# Patient Record
Sex: MALE | Race: WHITE | NOT HISPANIC OR LATINO | Employment: FULL TIME | URBAN - METROPOLITAN AREA
[De-identification: names, ages, dates, MRNs, and addresses within clinical notes are randomized per-mention and may not be internally consistent; named-entity substitution may affect disease eponyms.]

---

## 2020-04-23 ENCOUNTER — LAB VISIT (OUTPATIENT)
Dept: FAMILY MEDICINE | Facility: CLINIC | Age: 40
End: 2020-04-23
Payer: COMMERCIAL

## 2020-04-23 DIAGNOSIS — Z20.822 SUSPECTED COVID-19 VIRUS INFECTION: ICD-10-CM

## 2020-04-23 DIAGNOSIS — Z20.822 SUSPECTED COVID-19 VIRUS INFECTION: Primary | ICD-10-CM

## 2020-04-23 PROCEDURE — U0002 COVID-19 LAB TEST NON-CDC: HCPCS

## 2020-04-24 ENCOUNTER — TELEPHONE (OUTPATIENT)
Dept: FAMILY MEDICINE | Facility: CLINIC | Age: 40
End: 2020-04-24

## 2020-04-24 LAB — SARS-COV-2 RNA RESP QL NAA+PROBE: NOT DETECTED

## 2020-04-24 NOTE — TELEPHONE ENCOUNTER
----- Message from Rosalee Alberto PA-C sent at 4/24/2020  8:52 AM CDT -----  Your test was NEGATIVE for COVID-19 (coronavirus).  If you have symptoms, treat with rest, fluids, and over-the-counter medications.  Continue to stay home and practice proper handwashing.     If your symptoms worsen or if you have any other concerns, please contact [facility] at [phone].

## 2023-05-15 ENCOUNTER — OFFICE VISIT (OUTPATIENT)
Dept: URGENT CARE | Facility: CLINIC | Age: 43
End: 2023-05-15
Payer: COMMERCIAL

## 2023-05-15 VITALS
BODY MASS INDEX: 25.48 KG/M2 | HEART RATE: 80 BPM | WEIGHT: 182 LBS | RESPIRATION RATE: 18 BRPM | HEIGHT: 71 IN | OXYGEN SATURATION: 97 % | SYSTOLIC BLOOD PRESSURE: 114 MMHG | DIASTOLIC BLOOD PRESSURE: 77 MMHG | TEMPERATURE: 98 F

## 2023-05-15 DIAGNOSIS — S39.012A STRAIN OF LUMBAR PARASPINOUS MUSCLE, INITIAL ENCOUNTER: Primary | ICD-10-CM

## 2023-05-15 PROCEDURE — 96372 PR INJECTION,THERAP/PROPH/DIAG2ST, IM OR SUBCUT: ICD-10-PCS | Mod: S$GLB,,, | Performed by: INTERNAL MEDICINE

## 2023-05-15 PROCEDURE — 99204 OFFICE O/P NEW MOD 45 MIN: CPT | Mod: 25,S$GLB,, | Performed by: INTERNAL MEDICINE

## 2023-05-15 PROCEDURE — 99204 PR OFFICE/OUTPT VISIT, NEW, LEVL IV, 45-59 MIN: ICD-10-PCS | Mod: 25,S$GLB,, | Performed by: INTERNAL MEDICINE

## 2023-05-15 PROCEDURE — 96372 THER/PROPH/DIAG INJ SC/IM: CPT | Mod: S$GLB,,, | Performed by: INTERNAL MEDICINE

## 2023-05-15 RX ORDER — FINASTERIDE 1 MG/1
1 TABLET, FILM COATED ORAL DAILY
COMMUNITY

## 2023-05-15 RX ORDER — TIZANIDINE 2 MG/1
4 TABLET ORAL EVERY 8 HOURS PRN
Qty: 30 TABLET | Refills: 0 | Status: SHIPPED | OUTPATIENT
Start: 2023-05-15 | End: 2023-05-25

## 2023-05-15 RX ORDER — MELOXICAM 15 MG/1
15 TABLET ORAL DAILY
Qty: 14 TABLET | Refills: 0 | Status: SHIPPED | OUTPATIENT
Start: 2023-05-16 | End: 2023-05-30

## 2023-05-15 RX ORDER — KETOROLAC TROMETHAMINE 30 MG/ML
30 INJECTION, SOLUTION INTRAMUSCULAR; INTRAVENOUS
Status: COMPLETED | OUTPATIENT
Start: 2023-05-15 | End: 2023-05-15

## 2023-05-15 RX ORDER — DOLUTEGRAVIR SODIUM AND LAMIVUDINE 50; 300 MG/1; MG/1
1 TABLET, FILM COATED ORAL
COMMUNITY

## 2023-05-15 RX ADMIN — KETOROLAC TROMETHAMINE 30 MG: 30 INJECTION, SOLUTION INTRAMUSCULAR; INTRAVENOUS at 02:05

## 2023-05-15 NOTE — PATIENT INSTRUCTIONS
If you received toradol during your visit today, this is a powerful NSAID. Refrain from taking other NSAIDS such as ibuprofen, aleve, goodies powder for the next 24 hours.     Should you or your child develop any worsening or new symptoms after leaving urgent care, it is recommended that you go to the ER for further/repeat evaluation.     Follow up with your PCP or child's pediatrician in 3-5 days after your urgent care visit.     If you smoke, please stop smoking. It is very dangerous for your health.     If a referral was placed you may call [phone] to schedule this appointment.     Please remember that you have received care at an urgent care today. Urgent cares are not emergency rooms and are not equipped to handle life threatening emergencies and cannot rule in or out certain medical conditions and you may be released before all of your medical problems are known or treated, please schedule all follow up appointments as discussed and if you have worsening symptoms please go to the ER to rule out potential life threatening problems, as discussed.

## 2023-05-15 NOTE — PROGRESS NOTES
"Subjective:      Patient ID: José Miguel Hilton is a 42 y.o. male.    Vitals:  height is 5' 11" (1.803 m) and weight is 82.6 kg (182 lb). His temperature is 97.6 °F (36.4 °C). His blood pressure is 114/77 and his pulse is 80. His respiration is 18 and oxygen saturation is 97%.     Chief Complaint: Back Pain    Pain started during exercising on Friday. Lower left side pain in his back.    Back Pain  This is a new problem. The current episode started in the past 7 days. The problem occurs constantly. The problem has been gradually worsening since onset. The quality of the pain is described as aching. The pain radiates to the left thigh. The pain is at a severity of 6/10. The pain is moderate. The pain is The same all the time. The symptoms are aggravated by bending, twisting and coughing. Stiffness is present In the morning. Pertinent negatives include no dysuria.     Genitourinary:  Negative for dysuria.   Musculoskeletal:  Positive for back pain.   Skin:  Negative for erythema.    Objective:     Physical Exam   Constitutional: He is oriented to person, place, and time. He appears well-developed. No distress.   HENT:   Head: Normocephalic and atraumatic.   Ears:   Right Ear: External ear normal.   Left Ear: External ear normal.   Nose: Nose normal.   Mouth/Throat: Oropharynx is clear and moist. No oropharyngeal exudate.   Eyes: Conjunctivae and EOM are normal. Pupils are equal, round, and reactive to light. Right eye exhibits no discharge. Left eye exhibits no discharge. No scleral icterus.   Neck: Neck supple.   Cardiovascular: Normal rate, regular rhythm and normal heart sounds.   No murmur heard.Exam reveals no gallop and no friction rub.   Pulmonary/Chest: Effort normal. No respiratory distress. He has no wheezes. He has no rales.   Abdominal: Bowel sounds are normal. He exhibits no distension. Soft.   Musculoskeletal:      Thoracic back: Normal.      Lumbar back: He exhibits tenderness. He exhibits normal range of " motion, no swelling, no edema and no deformity.        Back:    Lymphadenopathy:     He has no cervical adenopathy.   Neurological: He is alert and oriented to person, place, and time. He has normal motor skills and normal sensation. Coordination normal. GCS eye subscore is 4. GCS verbal subscore is 5. GCS motor subscore is 6.   Reflex Scores:       Patellar reflexes are 2+ on the right side and 2+ on the left side.  Skin: Skin is warm, dry, not diaphoretic and no rash. Capillary refill takes less than 2 seconds. No erythema   Psychiatric: His behavior is normal.   Nursing note and vitals reviewed.  No saddle anesthesia.   Assessment:     1. Strain of lumbar paraspinous muscle, initial encounter        Plan:       Strain of lumbar paraspinous muscle, initial encounter  -     ketorolac injection 30 mg  -     meloxicam (MOBIC) 15 MG tablet; Take 1 tablet (15 mg total) by mouth once daily. for 14 days  Dispense: 14 tablet; Refill: 0  -     tiZANidine (ZANAFLEX) 2 MG tablet; Take 2 tablets (4 mg total) by mouth every 8 (eight) hours as needed (spasm/pain).  Dispense: 30 tablet; Refill: 0

## 2023-06-25 ENCOUNTER — OFFICE VISIT (OUTPATIENT)
Dept: URGENT CARE | Facility: CLINIC | Age: 43
End: 2023-06-25
Payer: COMMERCIAL

## 2023-06-25 VITALS
WEIGHT: 175 LBS | BODY MASS INDEX: 24.5 KG/M2 | OXYGEN SATURATION: 98 % | SYSTOLIC BLOOD PRESSURE: 115 MMHG | HEART RATE: 80 BPM | RESPIRATION RATE: 18 BRPM | HEIGHT: 71 IN | DIASTOLIC BLOOD PRESSURE: 72 MMHG | TEMPERATURE: 99 F

## 2023-06-25 DIAGNOSIS — S46.212A BICEPS MUSCLE TEAR, LEFT, INITIAL ENCOUNTER: ICD-10-CM

## 2023-06-25 DIAGNOSIS — M79.602 PAIN IN LEFT ARM: ICD-10-CM

## 2023-06-25 PROCEDURE — 73060 XR HUMERUS 2 VIEW LEFT: ICD-10-PCS | Mod: LT,S$GLB,, | Performed by: RADIOLOGY

## 2023-06-25 PROCEDURE — 73060 X-RAY EXAM OF HUMERUS: CPT | Mod: LT,S$GLB,, | Performed by: RADIOLOGY

## 2023-06-25 PROCEDURE — 99214 PR OFFICE/OUTPT VISIT, EST, LEVL IV, 30-39 MIN: ICD-10-PCS | Mod: S$GLB,,, | Performed by: FAMILY MEDICINE

## 2023-06-25 PROCEDURE — 99214 OFFICE O/P EST MOD 30 MIN: CPT | Mod: S$GLB,,, | Performed by: FAMILY MEDICINE

## 2023-06-25 RX ORDER — TADALAFIL 20 MG/1
TABLET ORAL
COMMUNITY
Start: 2023-05-26

## 2023-06-25 RX ORDER — ELVITEGRAVIR, COBICISTAT, EMTRICITABINE, AND TENOFOVIR ALAFENAMIDE 150; 150; 200; 10 MG/1; MG/1; MG/1; MG/1
1 TABLET ORAL
COMMUNITY
Start: 2023-04-27 | End: 2023-06-25 | Stop reason: ALTCHOICE

## 2023-06-25 RX ORDER — IBUPROFEN 600 MG/1
600 TABLET ORAL EVERY 8 HOURS PRN
Qty: 30 TABLET | Refills: 0 | Status: SHIPPED | OUTPATIENT
Start: 2023-06-25 | End: 2023-07-05

## 2023-06-25 RX ORDER — DOXYCYCLINE 100 MG/1
CAPSULE ORAL
COMMUNITY
Start: 2023-06-07

## 2023-06-25 NOTE — PROGRESS NOTES
"Subjective:      Patient ID: José Miguel Hilton is a 42 y.o. male.    Vitals:  height is 5' 11" (1.803 m) and weight is 79.4 kg (175 lb). His temperature is 98.8 °F (37.1 °C). His blood pressure is 115/72 and his pulse is 80. His respiration is 18 and oxygen saturation is 98%.     Chief Complaint: Arm Pain    Pt presents complaints of left arm pain. Pain started today. Pt states he was at the gym this morning and went to do a pull up and heard a crunching noise in his arm. Pain level 5. Constant. Pt states his arm does not hurt until he flexes his muscles. OTC none ice pack used with no relief.     Arm Pain   The incident occurred 1 to 3 hours ago. The incident occurred at the gym. The injury mechanism is unknown. The pain is present in the upper left arm. The quality of the pain is described as cramping and shooting. The pain does not radiate. The pain is at a severity of 5/10. The pain is mild. The pain has been Constant since the incident. Associated symptoms include numbness and tingling. Nothing aggravates the symptoms. He has tried nothing for the symptoms.   Neurological:  Positive for numbness.    Objective:     Physical Exam  Constitutional: Pt oriented to person, place, and time.  Non-toxic appearance.   Patient does not appear ill. No distress. normal  HENT: No icterus or facial swelling appreciated  Head: Normocephalic and atraumatic.   Nose: No congestion.   Pulmonary/Chest: Effort normal. No stridor. No respiratory distress.   Abdominal: Normal appearance. Abdomen exhibits no distension.   Musculoskeletal:         Left upper extremity:  Belly of Biceps appears to be running higher than on right side.  Provide deformity present.  There is mild tenderness to palpation at the distal upper arm in the lateral aspect of the elbow.  There is no gross swelling or ecchymosis.  Full range of motion flexing extending supinate and pronate and elbow.  Neurological: no focal deficit. Patient is alert and oriented to " person, place, and time.   Skin: Skin is not diaphoretic and not pale. no jaundice  Psychiatric: Patients behavior is normal. Mood, judgment and thought content normal.     Assessment:     1. Pain in left arm    2. Biceps muscle tear, left, initial encounter        Plan:       Pain in left arm  -     XR HUMERUS 2 VIEW LEFT; Future; Expected date: 06/25/2023-- soft tissue findings suspicious for biceps brachii rupture    -     Ambulatory referral/consult to Orthopedics  -     SLING FOR HOME USE  -     ibuprofen (ADVIL,MOTRIN) 600 MG tablet; Take 1 tablet (600 mg total) by mouth every 8 (eight) hours as needed for Pain.  Dispense: 30 tablet; Refill: 0    Biceps muscle tear, left, initial encounter  -     Ambulatory referral/consult to Orthopedics  -     SLING FOR HOME USE  -     ibuprofen (ADVIL,MOTRIN) 600 MG tablet; Take 1 tablet (600 mg total) by mouth every 8 (eight) hours as needed for Pain.  Dispense: 30 tablet; Refill: 0    The time of discharge patient had already made an appointment for orthopedic specialist tomorrow through the [facility] after the referral has been placed.    Advised to follow up closely with orthopedic specialist sling wear and [facility] therapy

## 2023-06-26 ENCOUNTER — OFFICE VISIT (OUTPATIENT)
Dept: SPORTS MEDICINE | Facility: CLINIC | Age: 43
End: 2023-06-26
Payer: COMMERCIAL

## 2023-06-26 VITALS
HEIGHT: 71 IN | DIASTOLIC BLOOD PRESSURE: 72 MMHG | SYSTOLIC BLOOD PRESSURE: 138 MMHG | BODY MASS INDEX: 26.15 KG/M2 | WEIGHT: 186.75 LBS

## 2023-06-26 DIAGNOSIS — M79.622 PAIN IN LEFT UPPER ARM: Primary | ICD-10-CM

## 2023-06-26 DIAGNOSIS — S46.212A RUPTURE OF DISTAL BICEPS TENDON, LEFT, INITIAL ENCOUNTER: ICD-10-CM

## 2023-06-26 PROCEDURE — 3075F PR MOST RECENT SYSTOLIC BLOOD PRESS GE 130-139MM HG: ICD-10-PCS | Mod: CPTII,S$GLB,, | Performed by: PHYSICIAN ASSISTANT

## 2023-06-26 PROCEDURE — 99999 PR PBB SHADOW E&M-EST. PATIENT-LVL III: ICD-10-PCS | Mod: PBBFAC,,, | Performed by: PHYSICIAN ASSISTANT

## 2023-06-26 PROCEDURE — 3075F SYST BP GE 130 - 139MM HG: CPT | Mod: CPTII,S$GLB,, | Performed by: PHYSICIAN ASSISTANT

## 2023-06-26 PROCEDURE — 1159F MED LIST DOCD IN RCRD: CPT | Mod: CPTII,S$GLB,, | Performed by: PHYSICIAN ASSISTANT

## 2023-06-26 PROCEDURE — 99203 OFFICE O/P NEW LOW 30 MIN: CPT | Mod: S$GLB,,, | Performed by: PHYSICIAN ASSISTANT

## 2023-06-26 PROCEDURE — 3008F BODY MASS INDEX DOCD: CPT | Mod: CPTII,S$GLB,, | Performed by: PHYSICIAN ASSISTANT

## 2023-06-26 PROCEDURE — 99999 PR PBB SHADOW E&M-EST. PATIENT-LVL III: CPT | Mod: PBBFAC,,, | Performed by: PHYSICIAN ASSISTANT

## 2023-06-26 PROCEDURE — 3008F PR BODY MASS INDEX (BMI) DOCUMENTED: ICD-10-PCS | Mod: CPTII,S$GLB,, | Performed by: PHYSICIAN ASSISTANT

## 2023-06-26 PROCEDURE — 3078F DIAST BP <80 MM HG: CPT | Mod: CPTII,S$GLB,, | Performed by: PHYSICIAN ASSISTANT

## 2023-06-26 PROCEDURE — 3078F PR MOST RECENT DIASTOLIC BLOOD PRESSURE < 80 MM HG: ICD-10-PCS | Mod: CPTII,S$GLB,, | Performed by: PHYSICIAN ASSISTANT

## 2023-06-26 PROCEDURE — 99203 PR OFFICE/OUTPT VISIT, NEW, LEVL III, 30-44 MIN: ICD-10-PCS | Mod: S$GLB,,, | Performed by: PHYSICIAN ASSISTANT

## 2023-06-26 PROCEDURE — 1159F PR MEDICATION LIST DOCUMENTED IN MEDICAL RECORD: ICD-10-PCS | Mod: CPTII,S$GLB,, | Performed by: PHYSICIAN ASSISTANT

## 2023-06-26 NOTE — PROGRESS NOTES
NEW PATIENT    HISTORY OF PRESENT ILLNESS   42 y.o. Male with a history of left elbow pain who injured his arm yesterday while working out.  When attempting to perform a pull up, he felt a tearing sensation within the crease of his elbow.  He had immediate discomfort and has noticed asymmetry of his biceps muscle.  He reports the pain has been minimal and he has good range of motion.        + mechanical symptoms, - instability    Is affecting ADLs.  Pain is 6/10 at it's worst.        PAST MEDICAL HISTORY    History reviewed. No pertinent past medical history.    PAST SURGICAL HISTORY     History reviewed. No pertinent surgical history.    FAMILY HISTORY    History reviewed. No pertinent family history.    SOCIAL HISTORY    Social History     Socioeconomic History    Marital status: Single   Tobacco Use    Smoking status: Never    Smokeless tobacco: Never       MEDICATIONS      Current Outpatient Medications:     dolutegravir-lamivudine (DOVATO)  mg Tab, Take 1 tablet by mouth., Disp: , Rfl:     doxycycline (VIBRAMYCIN) 100 MG Cap, Take by mouth., Disp: , Rfl:     finasteride (PROPECIA) 1 mg tablet, Take 1 mg by mouth once daily., Disp: , Rfl:     ibuprofen (ADVIL,MOTRIN) 600 MG tablet, Take 1 tablet (600 mg total) by mouth every 8 (eight) hours as needed for Pain., Disp: 30 tablet, Rfl: 0    tadalafiL (CIALIS) 20 MG Tab, Take by mouth., Disp: , Rfl:     ALLERGIES     Review of patient's allergies indicates:  No Known Allergies      REVIEW OF SYSTEMS   Constitution: Negative. Negative for chills, fever and night sweats.   HENT: Negative for congestion and headaches.    Eyes: Negative for blurred vision, left vision loss and right vision loss.   Cardiovascular: Negative for chest pain and syncope.   Respiratory: Negative for cough and shortness of breath.    Endocrine: Negative for polydipsia, polyphagia and polyuria.   Hematologic/Lymphatic: Negative for bleeding problem. Does not bruise/bleed easily.   Skin:  "Negative for dry skin, itching and rash.   Musculoskeletal: Negative for falls. Positive for left elbow pain.  Gastrointestinal: Negative for abdominal pain and bowel incontinence.   Genitourinary: Negative for bladder incontinence and nocturia.   Neurological: Negative for disturbances in coordination, loss of balance and seizures.   Psychiatric/Behavioral: Negative for depression. The patient does not have insomnia.    Allergic/Immunologic: Negative for hives and persistent infections.     PHYSICAL EXAMINATION    Vitals: /72 (BP Location: Right arm, Patient Position: Sitting, BP Method: Medium (Manual))   Ht 5' 11" (1.803 m)   Wt 84.7 kg (186 lb 11.7 oz)   BMI 26.04 kg/m²     General: The patient appears active and healthy with no apparent physical problems.  No disturbance of mood or affect is demonstrated. Alert and Oriented.    HEENT: Eyes normal, pupils equally round, nose normal.    Resp: Equal and symmetrical chest rises. No wheezing    CV: Regular rate    Neck: Supple; nonpainful range of motion.    Extremities: no cyanosis, clubbing, edema, or diffuse swelling.  Palpable pulses, good capillary refill of the digits.  No coolness, discoloration, edema or obvious varicosities.    Skin: no lesions noted.    Lymphatic: no detected adenopathy in the upper or lower extremities.    Neurologic: normal mental status, normal reflexes, normal gait and balance.  Patient is alert and oriented to person, place and time.  No flaccidity or spasticity is noted.  No motor or sensory deficits are noted.  Light touch is intact    Orthopaedic: Elbow Musculoskeletal Exam    Inspection    Left      Ecchymosis: none      Swelling: diffuse      Prior incision: none    Inspection additional comments: There is asymmetry of the distal biceps compared to the contralateral side    Palpation    Left      Left elbow palpation is normal.        Crepitus (radiocapitellar): none      Crepitus (ulnohumeral): none        Crepitus " (forearm rotation): none      Tenderness: present        Medial/flexor origin: none        Lateral/extensor origin: none        Radiocapitellar: moderate        Posterior-olecranon: none        Anterior: moderate        Ulnar nerve: none        MCL: none        LCL: none    Range of Motion    Left      Left elbow range of motion is normal.        Active Extension: 0      Passive Extension: 0      Active Flexion: 140      Passive Flexion: 140      Active Pronation: 90      Passive Pronation: 90      Active Supination: 90      Passive Supination: 90    Strength    Left      Extension: 5/5. Extension is not affected by pain.       Flexion: 4/5. Flexion is affected by pain.       Pronation: 5/5. Pronation is affected by pain.       Supination: 4+/5. Supination is affected by pain.       Finger abduction: 5/5. Finger abduction is not affected by pain.        strength: 5/5.  strength is not affected by pain.     Neurovascular    Left      Left elbow nerve sensation is normal.    Special Tests    Left      Left elbow special tests are normal.     Instability Exam      Varus test: negative        Valgus test: negative         Neurological Signs              Pain with resisted pronation: positive       Pain with resisted supination: positive     Special Signs       Pain with terminal extension: positive      Special tests additional comments: + hook sign      IMAGING    XR HUMERUS 2 VIEW LEFT  Narrative: EXAMINATION:  XR HUMERUS 2 VIEW LEFT    CLINICAL HISTORY:  Pain in left arm    TECHNIQUE:  Left humerus, two views    COMPARISON:  None    FINDINGS:  No fracture.  No lytic or blastic lesion.  Suspected proximal biceps brachii retraction in keeping with distal tendon tear.  Impression: As above.  Consider further evaluation with MRI elbow.    Electronically signed by: Manoj Gonsalez MD  Date:    06/25/2023  Time:    12:10        IMPRESSION       ICD-10-CM ICD-9-CM   1. Pain in left upper arm  M79.622 729.5   2.  Rupture of distal biceps tendon, left, initial encounter  S46.212A 840.8       MEDICATIONS PRESCRIBED      None    RECOMMENDATIONS     MRI left elbow for evaluation of suspected distal biceps rupture  Continue to use sling as needed for comfort  RTC with Sidney Morris MD for MRI results and surgical planning; patient has an upcoming trip to [facility] in August and would like to have surgery before his trip if possible      All questions were answered, pt will contact us for questions or concerns in the interim.

## 2023-06-27 ENCOUNTER — HOSPITAL ENCOUNTER (OUTPATIENT)
Dept: RADIOLOGY | Facility: OTHER | Age: 43
Discharge: HOME | End: 2023-06-27
Attending: PHYSICIAN ASSISTANT
Payer: COMMERCIAL

## 2023-06-27 DIAGNOSIS — M79.622 PAIN IN LEFT UPPER ARM: ICD-10-CM

## 2023-06-27 DIAGNOSIS — S46.212A RUPTURE OF DISTAL BICEPS TENDON, LEFT, INITIAL ENCOUNTER: ICD-10-CM

## 2023-06-27 PROCEDURE — 73221 MRI JOINT UPR EXTREM W/O DYE: CPT | Mod: 26,LT,, | Performed by: RADIOLOGY

## 2023-06-27 PROCEDURE — 73221 MRI JOINT UPR EXTREM W/O DYE: CPT | Mod: TC,LT

## 2023-06-27 PROCEDURE — 73221 MRI ELBOW WITHOUT CONTRAST LEFT: ICD-10-PCS | Mod: 26,LT,, | Performed by: RADIOLOGY

## 2023-06-29 ENCOUNTER — PATIENT MESSAGE (OUTPATIENT)
Dept: SPORTS MEDICINE | Facility: CLINIC | Age: 43
End: 2023-06-29
Payer: COMMERCIAL

## 2023-07-03 ENCOUNTER — OFFICE VISIT (OUTPATIENT)
Dept: SPORTS MEDICINE | Facility: CLINIC | Age: 43
End: 2023-07-03
Payer: COMMERCIAL

## 2023-07-03 ENCOUNTER — HOSPITAL ENCOUNTER (OUTPATIENT)
Dept: RADIOLOGY | Facility: HOSPITAL | Age: 43
Discharge: HOME | End: 2023-07-03
Attending: ORTHOPAEDIC SURGERY
Payer: COMMERCIAL

## 2023-07-03 VITALS
WEIGHT: 185 LBS | DIASTOLIC BLOOD PRESSURE: 83 MMHG | HEIGHT: 71 IN | HEART RATE: 95 BPM | SYSTOLIC BLOOD PRESSURE: 127 MMHG | BODY MASS INDEX: 25.9 KG/M2

## 2023-07-03 DIAGNOSIS — Z21: ICD-10-CM

## 2023-07-03 DIAGNOSIS — S46.212A RUPTURE OF DISTAL BICEPS TENDON, LEFT, INITIAL ENCOUNTER: Primary | ICD-10-CM

## 2023-07-03 DIAGNOSIS — S46.212A RUPTURE OF DISTAL BICEPS TENDON, LEFT, INITIAL ENCOUNTER: ICD-10-CM

## 2023-07-03 PROCEDURE — 1159F PR MEDICATION LIST DOCUMENTED IN MEDICAL RECORD: ICD-10-PCS | Mod: CPTII,S$GLB,, | Performed by: ORTHOPAEDIC SURGERY

## 2023-07-03 PROCEDURE — 3079F DIAST BP 80-89 MM HG: CPT | Mod: CPTII,S$GLB,, | Performed by: ORTHOPAEDIC SURGERY

## 2023-07-03 PROCEDURE — 3008F PR BODY MASS INDEX (BMI) DOCUMENTED: ICD-10-PCS | Mod: CPTII,S$GLB,, | Performed by: ORTHOPAEDIC SURGERY

## 2023-07-03 PROCEDURE — 99215 OFFICE O/P EST HI 40 MIN: CPT | Mod: S$GLB,,, | Performed by: ORTHOPAEDIC SURGERY

## 2023-07-03 PROCEDURE — 99215 PR OFFICE/OUTPT VISIT, EST, LEVL V, 40-54 MIN: ICD-10-PCS | Mod: S$GLB,,, | Performed by: ORTHOPAEDIC SURGERY

## 2023-07-03 PROCEDURE — 73080 X-RAY EXAM OF ELBOW: CPT | Mod: 26,LT,, | Performed by: RADIOLOGY

## 2023-07-03 PROCEDURE — 3074F PR MOST RECENT SYSTOLIC BLOOD PRESSURE < 130 MM HG: ICD-10-PCS | Mod: CPTII,S$GLB,, | Performed by: ORTHOPAEDIC SURGERY

## 2023-07-03 PROCEDURE — 73080 XR ELBOW COMPLETE 3 VIEW LEFT: ICD-10-PCS | Mod: 26,LT,, | Performed by: RADIOLOGY

## 2023-07-03 PROCEDURE — 1159F MED LIST DOCD IN RCRD: CPT | Mod: CPTII,S$GLB,, | Performed by: ORTHOPAEDIC SURGERY

## 2023-07-03 PROCEDURE — 3074F SYST BP LT 130 MM HG: CPT | Mod: CPTII,S$GLB,, | Performed by: ORTHOPAEDIC SURGERY

## 2023-07-03 PROCEDURE — 73080 X-RAY EXAM OF ELBOW: CPT | Mod: TC,LT

## 2023-07-03 PROCEDURE — 3008F BODY MASS INDEX DOCD: CPT | Mod: CPTII,S$GLB,, | Performed by: ORTHOPAEDIC SURGERY

## 2023-07-03 PROCEDURE — 99999 PR PBB SHADOW E&M-EST. PATIENT-LVL III: ICD-10-PCS | Mod: PBBFAC,,, | Performed by: ORTHOPAEDIC SURGERY

## 2023-07-03 PROCEDURE — 99999 PR PBB SHADOW E&M-EST. PATIENT-LVL III: CPT | Mod: PBBFAC,,, | Performed by: ORTHOPAEDIC SURGERY

## 2023-07-03 PROCEDURE — 3079F PR MOST RECENT DIASTOLIC BLOOD PRESSURE 80-89 MM HG: ICD-10-PCS | Mod: CPTII,S$GLB,, | Performed by: ORTHOPAEDIC SURGERY

## 2023-07-03 NOTE — PROGRESS NOTES
NEW PATIENT    HISTORY OF PRESENT ILLNESS   42 y.o. Male with a history of left elbow pain who is referred today by Tuan Mcguire PA-C. 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 He was seen by Tuan who ordered an MRI.      History 6/27/2023:   42 y.o. Male with a history of left elbow pain who injured his arm yesterday while working out.  When attempting to perform a pull up, he felt a tearing sensation within the crease of his elbow.  He had immediate discomfort and has noticed asymmetry of his biceps muscle.  He reports the pain has been minimal and he has good range of motion.     Is affecting ADLs.  Pain is 2/10 at it's worst.        PAST MEDICAL HISTORY    History reviewed. No pertinent past medical history.    PAST SURGICAL HISTORY     History reviewed. No pertinent surgical history.    FAMILY HISTORY    History reviewed. No pertinent family history.    SOCIAL HISTORY    Social History     Socioeconomic History    Marital status: Single   Tobacco Use    Smoking status: Never    Smokeless tobacco: Never       MEDICATIONS      Current Outpatient Medications:     dolutegravir-lamivudine (DOVATO)  mg Tab, Take 1 tablet by mouth., Disp: , Rfl:     doxycycline (VIBRAMYCIN) 100 MG Cap, Take by mouth., Disp: , Rfl:     finasteride (PROPECIA) 1 mg tablet, Take 1 mg by mouth once daily., Disp: , Rfl:     ibuprofen (ADVIL,MOTRIN) 600 MG tablet, Take 1 tablet (600 mg total) by mouth every 8 (eight) hours as needed for Pain., Disp: 30 tablet, Rfl: 0    tadalafiL (CIALIS) 20 MG Tab, Take by mouth., Disp: , Rfl:     ALLERGIES     Review of patient's allergies indicates:  No Known Allergies      REVIEW OF SYSTEMS   Constitution: Negative. Negative for chills, fever and night sweats.   HENT: Negative for congestion and headaches.    Eyes: Negative for blurred vision, left vision loss and right vision loss.   Cardiovascular: Negative for chest pain and syncope.   Respiratory: Negative for cough and shortness of breath.   "  Endocrine: Negative for polydipsia, polyphagia and polyuria.   Hematologic/Lymphatic: Negative for bleeding problem. Does not bruise/bleed easily.   Skin: Negative for dry skin, itching and rash.   Musculoskeletal: Negative for falls. Positive for left elbow pain  Gastrointestinal: Negative for abdominal pain and bowel incontinence.   Genitourinary: Negative for bladder incontinence and nocturia.   Neurological: Negative for disturbances in coordination, loss of balance and seizures.   Psychiatric/Behavioral: Negative for depression. The patient does not have insomnia.    Allergic/Immunologic: Negative for hives and persistent infections.     PHYSICAL EXAMINATION    Vitals: /83   Pulse 95   Ht 5' 11" (1.803 m)   Wt 83.9 kg (185 lb)   BMI 25.80 kg/m²     General: The patient appears active and healthy with no apparent physical problems.  No disturbance of mood or affect is demonstrated. Alert and Oriented.    HEENT: Eyes normal, pupils equally round, nose normal.    Resp: Equal and symmetrical chest rises. No wheezing    CV: Regular rate    Neck: Supple; nonpainful range of motion.    Extremities: no cyanosis, clubbing, edema, or diffuse swelling.  Palpable pulses, good capillary refill of the digits.  No coolness, discoloration, edema or obvious varicosities.    Skin: no lesions noted.    Lymphatic: no detected adenopathy in the upper or lower extremities.    Neurologic: normal mental status, normal reflexes, normal gait and balance.  Patient is alert and oriented to person, place and time.  No flaccidity or spasticity is noted.  No motor or sensory deficits are noted.  Light touch is intact    Orthopaedic:     Elbow Exam-LEFT    Inspection: Normal skin color and appearance, no ecchymosis, no swelling, no scars.  There is a normal carrying angle.      ROM: 0° - 135+° with 80° supination and 80° pronation. Pain with pronation and supination      Palpation: No tenderness at the medial epicondyle, " olecranon, radial head, or lateral epicondyle. Tender distal biceps tendon     The wrist flexor-pronator muscle group is nontender.    The wrist extensor mobile wad is nontender.    Strength: Flexor and extensor motor strength is 5/5.      Stability: Varus and valgus stress reveals no instability. No Guarding    Neuro Reflexes are 2/2 biceps, brachioradialis and triceps. Sensation intact    Test: - Milking maneuver - VEO - Thinker's - Tinel's Sign - Ulnar nerve subluxation + Hook Test - Pain with resisted wrist ext - Pain with long finger ext      IMAGING    X-Ray Elbow Complete Left  Narrative: EXAMINATION:  XR ELBOW COMPLETE 3 VIEW LEFT    CLINICAL HISTORY:  Strain of muscle, fascia and tendon of other parts of biceps, left arm, initial encounter    TECHNIQUE:  AP, lateral, and oblique views of the left elbow were performed.    COMPARISON:  None    FINDINGS:  No fracture or dislocation.  No bone destruction identified  Impression: See above    Electronically signed by: Paul Jessica MD  Date:    07/03/2023  Time:    11:40    I reviewed an MRI of his left elbow which demonstrates a distal biceps tendon rupture with approximately 7-8 cm of retraction.      IMPRESSION       ICD-10-CM ICD-9-CM   1. Rupture of distal biceps tendon, left, initial encounter  S46.212A 840.8   2. HIV carrier  [id number]       MEDICATIONS PRESCRIBED      None    RECOMMENDATIONS     Surgical versus non-surgical options discussed today; left open distal biceps tendon repair  RTC for pre-op with Tuan Mcguire PA-C  The patient elected to proceed with operative intervention after all risks, benefits, and alternatives were discussed with the patient.  The risks of surgery include bleeding, scar formation, injuries to nerves, arteries and veins, need for additional surgeries, blood clots, infections, inability to return to the same level of the performance and the risk of anesthesia.   We also discussed the risks of surgery related to HIV.  He is  currently undetectable with a normal CD4 count.  He will send his labs to us.      All questions were answered, pt will contact us for questions or concerns in the interim.

## 2023-07-05 ENCOUNTER — PATIENT MESSAGE (OUTPATIENT)
Dept: PREADMISSION TESTING | Facility: HOSPITAL | Age: 43
End: 2023-07-05
Payer: COMMERCIAL

## 2023-07-05 NOTE — ANESTHESIA PAT ROS NOTE
07/05/2023  José Miguel Hilton is a 42 y.o., male.      Pre-op Assessment    I have reviewed the Patient Summary Reports.       I have reviewed the Medications.     Review of Systems  Anesthesia Hx:  No previous Anesthesia   Neg history of prior surgery.             Social:  Non-Smoker, Social Alcohol Use       Hematology/Oncology:    Oncology Normal                Hematology Comments: HIV carrier, (undetectable), CD4 is slightly low- see labs scanned into media tab                    EENT/Dental:  EENT/Dental Normal           Cardiovascular:  Cardiovascular Normal Exercise tolerance: good       Denies CAD.       Denies Angina.       Denies IBRAHIM.                            Pulmonary:  Pulmonary Normal    Denies Asthma.   Denies Shortness of breath.                  Renal/:     Creat = 1.54 on 4/28/2023,  GFR = 58             Hepatic/GI:  Hepatic/GI Normal     Denies GERD. Denies Liver Disease.            Musculoskeletal:     Rupture of distal biceps tendon, left,  DDD cervical spine       Spine Disorders: cervical Degenerative disease           Neurological:  Neurology Normal      Denies Headaches. Denies Seizures.                                Endocrine:  Endocrine Normal Denies Diabetes. Denies Hypothyroidism.          Psych:  Psychiatric Normal                   No past medical history on file.  No past surgical history on file.      Anesthesia Assessment: Preoperative EQUATION    Planned Procedure: Procedure(s) (LRB):  REPAIR, TENDON, BICEPS, DISTAL (Left)  Requested Anesthesia Type:General  Surgeon: Sidney Morris MD  Service: Orthopedics  Known or anticipated Date of Surgery:7/11/2023    Surgeon notes: reviewed    Electronic QUestionnaire Assessment completed via nurse interview with patient.        Triage considerations:     The patient has no apparent active cardiac condition (No unstable coronary  Syndrome such as severe unstable angina or recent [<1 month] myocardial infarction, decompensated CHF, severe valvular   disease or significant arrhythmia)    Previous anesthesia records:None    Last PCP note: outside [facility]                  Instructions given. (See in Nurse's note)      Ht: 5'11  Wt: 185 lb  BMI: 25.80  Fully Vaccinated

## 2023-07-07 ENCOUNTER — OFFICE VISIT (OUTPATIENT)
Dept: SPORTS MEDICINE | Facility: CLINIC | Age: 43
End: 2023-07-07
Payer: COMMERCIAL

## 2023-07-07 VITALS
SYSTOLIC BLOOD PRESSURE: 118 MMHG | WEIGHT: 186.38 LBS | HEART RATE: 92 BPM | BODY MASS INDEX: 26.09 KG/M2 | DIASTOLIC BLOOD PRESSURE: 78 MMHG | HEIGHT: 71 IN

## 2023-07-07 DIAGNOSIS — S46.212A RUPTURE OF DISTAL BICEPS TENDON, LEFT, INITIAL ENCOUNTER: Primary | ICD-10-CM

## 2023-07-07 PROCEDURE — 99499 UNLISTED E&M SERVICE: CPT | Mod: S$GLB,,, | Performed by: PHYSICIAN ASSISTANT

## 2023-07-07 PROCEDURE — 99999 PR PBB SHADOW E&M-EST. PATIENT-LVL III: CPT | Mod: PBBFAC,,, | Performed by: PHYSICIAN ASSISTANT

## 2023-07-07 PROCEDURE — 99999 PR PBB SHADOW E&M-EST. PATIENT-LVL III: ICD-10-PCS | Mod: PBBFAC,,, | Performed by: PHYSICIAN ASSISTANT

## 2023-07-07 PROCEDURE — 99499 NO LOS: ICD-10-PCS | Mod: S$GLB,,, | Performed by: PHYSICIAN ASSISTANT

## 2023-07-07 RX ORDER — INDOMETHACIN 75 MG/1
75 CAPSULE, EXTENDED RELEASE ORAL 2 TIMES DAILY
Qty: 14 CAPSULE | Refills: 0 | Status: SHIPPED | OUTPATIENT
Start: 2023-07-12 | End: 2023-07-19

## 2023-07-07 RX ORDER — ESOMEPRAZOLE MAGNESIUM 40 MG/1
40 CAPSULE, DELAYED RELEASE ORAL
Qty: 7 CAPSULE | Refills: 0 | Status: SHIPPED | OUTPATIENT
Start: 2023-07-12 | End: 2023-07-19

## 2023-07-07 RX ORDER — CEFAZOLIN SODIUM 2 G/50ML
2 SOLUTION INTRAVENOUS
Status: CANCELLED | OUTPATIENT
Start: 2023-07-07

## 2023-07-07 RX ORDER — MUPIROCIN 20 MG/G
OINTMENT TOPICAL
Status: CANCELLED | OUTPATIENT
Start: 2023-07-07

## 2023-07-07 RX ORDER — HYDROCODONE BITARTRATE AND ACETAMINOPHEN 7.5; 325 MG/1; MG/1
1 TABLET ORAL EVERY 6 HOURS PRN
Qty: 20 TABLET | Refills: 0 | Status: SHIPPED | OUTPATIENT
Start: 2023-07-07

## 2023-07-07 NOTE — H&P (VIEW-ONLY)
H&P    History 7/7/2023:  José Miguel returns here today for follow-up evaluation of his left elbow and to complete his preoperative paperwork.  He has a distal biceps tendon rupture and surgical intervention has been recommended.  He is scheduled to undergo an open distal biceps tendon repair on July 11, 2023.    History 7/3/2023:  42 y.o. Male with a history of left elbow pain who is referred today by Tuan Mcguire PA-C. 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 He was seen by Tuan who ordered an MRI.      History 6/26/2023:   42 y.o. Male with a history of left elbow pain who injured his arm yesterday while working out.  When attempting to perform a pull up, he felt a tearing sensation within the crease of his elbow.  He had immediate discomfort and has noticed asymmetry of his biceps muscle.  He reports the pain has been minimal and he has good range of motion.     Is affecting ADLs.  Pain is 2/10 at it's worst.        PAST MEDICAL HISTORY    History reviewed. No pertinent past medical history.    PAST SURGICAL HISTORY     History reviewed. No pertinent surgical history.    FAMILY HISTORY    History reviewed. No pertinent family history.    SOCIAL HISTORY    Social History     Socioeconomic History    Marital status: Single   Tobacco Use    Smoking status: Never    Smokeless tobacco: Never       MEDICATIONS      Current Outpatient Medications:     dolutegravir-lamivudine (DOVATO)  mg Tab, Take 1 tablet by mouth., Disp: , Rfl:     doxycycline (VIBRAMYCIN) 100 MG Cap, Take by mouth., Disp: , Rfl:     finasteride (PROPECIA) 1 mg tablet, Take 1 mg by mouth once daily., Disp: , Rfl:     tadalafiL (CIALIS) 20 MG Tab, Take by mouth., Disp: , Rfl:     ALLERGIES     Review of patient's allergies indicates:  No Known Allergies      REVIEW OF SYSTEMS   Constitution: Negative. Negative for chills, fever and night sweats.   HENT: Negative for congestion and headaches.    Eyes: Negative for blurred vision, left vision loss and  "right vision loss.   Cardiovascular: Negative for chest pain and syncope.   Respiratory: Negative for cough and shortness of breath.    Endocrine: Negative for polydipsia, polyphagia and polyuria.   Hematologic/Lymphatic: Negative for bleeding problem. Does not bruise/bleed easily.   Skin: Negative for dry skin, itching and rash.   Musculoskeletal: Negative for falls. Positive for left elbow pain  Gastrointestinal: Negative for abdominal pain and bowel incontinence.   Genitourinary: Negative for bladder incontinence and nocturia.   Neurological: Negative for disturbances in coordination, loss of balance and seizures.   Psychiatric/Behavioral: Negative for depression. The patient does not have insomnia.    Allergic/Immunologic: Negative for hives and persistent infections.     PHYSICAL EXAMINATION    Vitals: /78   Pulse 92   Ht 5' 11" (1.803 m)   Wt 84.5 kg (186 lb 6.4 oz)   BMI 26.00 kg/m²     General: The patient appears active and healthy with no apparent physical problems.  No disturbance of mood or affect is demonstrated. Alert and Oriented.    HEENT: Eyes normal, pupils equally round, nose normal.    Resp: Equal and symmetrical chest rises. No wheezing    CV: Regular rate    Neck: Supple; nonpainful range of motion.    Extremities: no cyanosis, clubbing, edema, or diffuse swelling.  Palpable pulses, good capillary refill of the digits.  No coolness, discoloration, edema or obvious varicosities.    Skin: no lesions noted.    Lymphatic: no detected adenopathy in the upper or lower extremities.    Neurologic: normal mental status, normal reflexes, normal gait and balance.  Patient is alert and oriented to person, place and time.  No flaccidity or spasticity is noted.  No motor or sensory deficits are noted.  Light touch is intact    Orthopaedic:     Elbow Exam-LEFT    Inspection: Normal skin color and appearance, no ecchymosis, no swelling, no scars.  There is a normal carrying angle.      ROM: 0° - " 135+° with 80° supination and 80° pronation. Pain with pronation and supination      Palpation: No tenderness at the medial epicondyle, olecranon, radial head, or lateral epicondyle. Tender distal biceps tendon     The wrist flexor-pronator muscle group is nontender.    The wrist extensor mobile wad is nontender.    Strength: Flexor and extensor motor strength is 5/5.      Stability: Varus and valgus stress reveals no instability. No Guarding    Neuro Reflexes are 2/2 biceps, brachioradialis and triceps. Sensation intact    Test: - Milking maneuver - VEO - Thinker's - Tinel's Sign - Ulnar nerve subluxation + Hook Test - Pain with resisted wrist ext - Pain with long finger ext      IMAGING    X-Ray Elbow Complete Left  Narrative: EXAMINATION:  XR ELBOW COMPLETE 3 VIEW LEFT    CLINICAL HISTORY:  Strain of muscle, fascia and tendon of other parts of biceps, left arm, initial encounter    TECHNIQUE:  AP, lateral, and oblique views of the left elbow were performed.    COMPARISON:  None    FINDINGS:  No fracture or dislocation.  No bone destruction identified  Impression: See above    Electronically signed by: Paul Jessica MD  Date:    07/03/2023  Time:    11:40    I reviewed an MRI of his left elbow which demonstrates a distal biceps tendon rupture with approximately 7-8 cm of retraction.      IMPRESSION       ICD-10-CM ICD-9-CM   1. Rupture of distal biceps tendon, left, initial encounter  S46.212A 840.8         MEDICATIONS PRESCRIBED      Hydrocodone 7.5/325 mg  Indocin 75 mg  Nexium 40 mg    RECOMMENDATIONS     Surgical versus non-surgical options discussed today; left open distal biceps tendon repair  The patient elected to proceed with operative intervention after all risks, benefits, and alternatives were discussed with the patient.  The risks of surgery include bleeding, scar formation, injuries to nerves, arteries and veins, need for additional surgeries, blood clots, infections, inability to return to the same  level of the performance and the risk of anesthesia.   We also discussed the risks of surgery related to HIV.  He is currently undetectable with a normal CD4 count.  Informed consent was obtained  Physical therapy babak - [facility]    All questions were answered, pt will contact us for questions or concerns in the interim.

## 2023-07-07 NOTE — PROGRESS NOTES
PREOPERATIVE APPOINTMENT    History 7/7/2023:  José Miguel returns here today for follow-up evaluation of his left elbow and to complete his preoperative paperwork.  He has a distal biceps tendon rupture and surgical intervention has been recommended.  He is scheduled to undergo an open distal biceps tendon repair on July 11, 2023.    History 7/3/2023:  42 y.o. Male with a history of left elbow pain who is referred today by Tuan Mcguire PA-C. 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 He was seen by Tuan who ordered an MRI.      History 6/26/2023:   42 y.o. Male with a history of left elbow pain who injured his arm yesterday while working out.  When attempting to perform a pull up, he felt a tearing sensation within the crease of his elbow.  He had immediate discomfort and has noticed asymmetry of his biceps muscle.  He reports the pain has been minimal and he has good range of motion.     Is affecting ADLs.  Pain is 2/10 at it's worst.        PAST MEDICAL HISTORY    History reviewed. No pertinent past medical history.    PAST SURGICAL HISTORY     History reviewed. No pertinent surgical history.    FAMILY HISTORY    History reviewed. No pertinent family history.    SOCIAL HISTORY    Social History     Socioeconomic History    Marital status: Single   Tobacco Use    Smoking status: Never    Smokeless tobacco: Never       MEDICATIONS      Current Outpatient Medications:     dolutegravir-lamivudine (DOVATO)  mg Tab, Take 1 tablet by mouth., Disp: , Rfl:     doxycycline (VIBRAMYCIN) 100 MG Cap, Take by mouth., Disp: , Rfl:     finasteride (PROPECIA) 1 mg tablet, Take 1 mg by mouth once daily., Disp: , Rfl:     tadalafiL (CIALIS) 20 MG Tab, Take by mouth., Disp: , Rfl:     ALLERGIES     Review of patient's allergies indicates:  No Known Allergies      REVIEW OF SYSTEMS   Constitution: Negative. Negative for chills, fever and night sweats.   HENT: Negative for congestion and headaches.    Eyes: Negative for blurred vision,  "left vision loss and right vision loss.   Cardiovascular: Negative for chest pain and syncope.   Respiratory: Negative for cough and shortness of breath.    Endocrine: Negative for polydipsia, polyphagia and polyuria.   Hematologic/Lymphatic: Negative for bleeding problem. Does not bruise/bleed easily.   Skin: Negative for dry skin, itching and rash.   Musculoskeletal: Negative for falls. Positive for left elbow pain  Gastrointestinal: Negative for abdominal pain and bowel incontinence.   Genitourinary: Negative for bladder incontinence and nocturia.   Neurological: Negative for disturbances in coordination, loss of balance and seizures.   Psychiatric/Behavioral: Negative for depression. The patient does not have insomnia.    Allergic/Immunologic: Negative for hives and persistent infections.     PHYSICAL EXAMINATION    Vitals: /78   Pulse 92   Ht 5' 11" (1.803 m)   Wt 84.5 kg (186 lb 6.4 oz)   BMI 26.00 kg/m²     General: The patient appears active and healthy with no apparent physical problems.  No disturbance of mood or affect is demonstrated. Alert and Oriented.    HEENT: Eyes normal, pupils equally round, nose normal.    Resp: Equal and symmetrical chest rises. No wheezing    CV: Regular rate    Neck: Supple; nonpainful range of motion.    Extremities: no cyanosis, clubbing, edema, or diffuse swelling.  Palpable pulses, good capillary refill of the digits.  No coolness, discoloration, edema or obvious varicosities.    Skin: no lesions noted.    Lymphatic: no detected adenopathy in the upper or lower extremities.    Neurologic: normal mental status, normal reflexes, normal gait and balance.  Patient is alert and oriented to person, place and time.  No flaccidity or spasticity is noted.  No motor or sensory deficits are noted.  Light touch is intact    Orthopaedic:     Elbow Exam-LEFT    Inspection: Normal skin color and appearance, no ecchymosis, no swelling, no scars.  There is a normal carrying " angle.      ROM: 0° - 135+° with 80° supination and 80° pronation. Pain with pronation and supination      Palpation: No tenderness at the medial epicondyle, olecranon, radial head, or lateral epicondyle. Tender distal biceps tendon     The wrist flexor-pronator muscle group is nontender.    The wrist extensor mobile wad is nontender.    Strength: Flexor and extensor motor strength is 5/5.      Stability: Varus and valgus stress reveals no instability. No Guarding    Neuro Reflexes are 2/2 biceps, brachioradialis and triceps. Sensation intact    Test: - Milking maneuver - VEO - Thinker's - Tinel's Sign - Ulnar nerve subluxation + Hook Test - Pain with resisted wrist ext - Pain with long finger ext      IMAGING    X-Ray Elbow Complete Left  Narrative: EXAMINATION:  XR ELBOW COMPLETE 3 VIEW LEFT    CLINICAL HISTORY:  Strain of muscle, fascia and tendon of other parts of biceps, left arm, initial encounter    TECHNIQUE:  AP, lateral, and oblique views of the left elbow were performed.    COMPARISON:  None    FINDINGS:  No fracture or dislocation.  No bone destruction identified  Impression: See above    Electronically signed by: Paul Jessica MD  Date:    07/03/2023  Time:    11:40    I reviewed an MRI of his left elbow which demonstrates a distal biceps tendon rupture with approximately 7-8 cm of retraction.      IMPRESSION       ICD-10-CM ICD-9-CM   1. Rupture of distal biceps tendon, left, initial encounter  S46.212A 840.8         MEDICATIONS PRESCRIBED      Hydrocodone 7.5/325 mg  Indocin 75 mg  Nexium 40 mg    RECOMMENDATIONS     Surgical versus non-surgical options discussed today; left open distal biceps tendon repair  The patient elected to proceed with operative intervention after all risks, benefits, and alternatives were discussed with the patient.  The risks of surgery include bleeding, scar formation, injuries to nerves, arteries and veins, need for additional surgeries, blood clots, infections, inability  to return to the same level of the performance and the risk of anesthesia.   We also discussed the risks of surgery related to HIV.  He is currently undetectable with a normal CD4 count.  Informed consent was obtained  Physical therapy babak - [facility]    All questions were answered, pt will contact us for questions or concerns in the interim.

## 2023-07-10 ENCOUNTER — ANESTHESIA EVENT (OUTPATIENT)
Dept: SURGERY | Facility: HOSPITAL | Age: 43
End: 2023-07-10
Payer: COMMERCIAL

## 2023-07-10 ENCOUNTER — TELEPHONE (OUTPATIENT)
Dept: SPORTS MEDICINE | Facility: CLINIC | Age: 43
End: 2023-07-10
Payer: COMMERCIAL

## 2023-07-11 ENCOUNTER — HOSPITAL ENCOUNTER (OUTPATIENT)
Facility: HOSPITAL | Age: 43
Discharge: HOME | End: 2023-07-11
Attending: ORTHOPAEDIC SURGERY | Admitting: ORTHOPAEDIC SURGERY
Payer: COMMERCIAL

## 2023-07-11 ENCOUNTER — ANESTHESIA (OUTPATIENT)
Dept: SURGERY | Facility: HOSPITAL | Age: 43
End: 2023-07-11
Payer: COMMERCIAL

## 2023-07-11 VITALS
HEIGHT: 71 IN | WEIGHT: 186 LBS | HEART RATE: 82 BPM | RESPIRATION RATE: 24 BRPM | TEMPERATURE: 98 F | BODY MASS INDEX: 26.04 KG/M2 | DIASTOLIC BLOOD PRESSURE: 78 MMHG | SYSTOLIC BLOOD PRESSURE: 124 MMHG | OXYGEN SATURATION: 96 %

## 2023-07-11 DIAGNOSIS — S46.212A RUPTURE OF DISTAL BICEPS TENDON, LEFT, INITIAL ENCOUNTER: Primary | ICD-10-CM

## 2023-07-11 PROCEDURE — 63600175 PHARM REV CODE 636 W HCPCS: Performed by: ANESTHESIOLOGY

## 2023-07-11 PROCEDURE — 24342 PR REINSERT BI/TRICEPS TENDON,DISTAL: ICD-10-PCS | Mod: LT,,, | Performed by: ORTHOPAEDIC SURGERY

## 2023-07-11 PROCEDURE — 24342 REPAIR OF RUPTURED TENDON: CPT | Mod: LT,,, | Performed by: ORTHOPAEDIC SURGERY

## 2023-07-11 PROCEDURE — D9220A PRA ANESTHESIA: Mod: CRNA,,, | Performed by: NURSE ANESTHETIST, CERTIFIED REGISTERED

## 2023-07-11 PROCEDURE — D9220A PRA ANESTHESIA: ICD-10-PCS | Mod: CRNA,,, | Performed by: NURSE ANESTHETIST, CERTIFIED REGISTERED

## 2023-07-11 PROCEDURE — C1889 IMPLANT/INSERT DEVICE, NOC: HCPCS | Performed by: ORTHOPAEDIC SURGERY

## 2023-07-11 PROCEDURE — 71000033 HC RECOVERY, INTIAL HOUR: Performed by: ORTHOPAEDIC SURGERY

## 2023-07-11 PROCEDURE — 71000015 HC POSTOP RECOV 1ST HR: Performed by: ORTHOPAEDIC SURGERY

## 2023-07-11 PROCEDURE — D9220A PRA ANESTHESIA: ICD-10-PCS | Mod: ANES,,, | Performed by: ANESTHESIOLOGY

## 2023-07-11 PROCEDURE — 25000003 PHARM REV CODE 250: Performed by: NURSE ANESTHETIST, CERTIFIED REGISTERED

## 2023-07-11 PROCEDURE — 36000708 HC OR TIME LEV III 1ST 15 MIN: Performed by: ORTHOPAEDIC SURGERY

## 2023-07-11 PROCEDURE — D9220A PRA ANESTHESIA: Mod: ANES,,, | Performed by: ANESTHESIOLOGY

## 2023-07-11 PROCEDURE — 37000009 HC ANESTHESIA EA ADD 15 MINS: Performed by: ORTHOPAEDIC SURGERY

## 2023-07-11 PROCEDURE — 94761 N-INVAS EAR/PLS OXIMETRY MLT: CPT

## 2023-07-11 PROCEDURE — 36000709 HC OR TIME LEV III EA ADD 15 MIN: Performed by: ORTHOPAEDIC SURGERY

## 2023-07-11 PROCEDURE — C1713 ANCHOR/SCREW BN/BN,TIS/BN: HCPCS | Performed by: ORTHOPAEDIC SURGERY

## 2023-07-11 PROCEDURE — 63600175 PHARM REV CODE 636 W HCPCS: Performed by: NURSE ANESTHETIST, CERTIFIED REGISTERED

## 2023-07-11 PROCEDURE — 24342 REPAIR OF RUPTURED TENDON: CPT | Mod: AS,LT,, | Performed by: PHYSICIAN ASSISTANT

## 2023-07-11 PROCEDURE — 99900035 HC TECH TIME PER 15 MIN (STAT)

## 2023-07-11 PROCEDURE — 25000003 PHARM REV CODE 250: Performed by: ORTHOPAEDIC SURGERY

## 2023-07-11 PROCEDURE — 27201423 OPTIME MED/SURG SUP & DEVICES STERILE SUPPLY: Performed by: ORTHOPAEDIC SURGERY

## 2023-07-11 PROCEDURE — 63600175 PHARM REV CODE 636 W HCPCS: Performed by: PHYSICIAN ASSISTANT

## 2023-07-11 PROCEDURE — 25000003 PHARM REV CODE 250: Performed by: PHYSICIAN ASSISTANT

## 2023-07-11 PROCEDURE — 24342 PR REINSERT BI/TRICEPS TENDON,DISTAL: ICD-10-PCS | Mod: AS,LT,, | Performed by: PHYSICIAN ASSISTANT

## 2023-07-11 PROCEDURE — 37000008 HC ANESTHESIA 1ST 15 MINUTES: Performed by: ORTHOPAEDIC SURGERY

## 2023-07-11 PROCEDURE — 25000003 PHARM REV CODE 250: Performed by: STUDENT IN AN ORGANIZED HEALTH CARE EDUCATION/TRAINING PROGRAM

## 2023-07-11 PROCEDURE — 71000039 HC RECOVERY, EACH ADD'L HOUR: Performed by: ORTHOPAEDIC SURGERY

## 2023-07-11 PROCEDURE — 25000003 PHARM REV CODE 250: Performed by: ANESTHESIOLOGY

## 2023-07-11 DEVICE — SYS IMPL PROXIMAL TENODESIS: Type: IMPLANTABLE DEVICE | Site: ELBOW | Status: FUNCTIONAL

## 2023-07-11 RX ORDER — FENTANYL CITRATE 50 UG/ML
25 INJECTION, SOLUTION INTRAMUSCULAR; INTRAVENOUS EVERY 5 MIN PRN
Status: COMPLETED | OUTPATIENT
Start: 2023-07-11 | End: 2023-07-11

## 2023-07-11 RX ORDER — FENTANYL CITRATE 50 UG/ML
INJECTION, SOLUTION INTRAMUSCULAR; INTRAVENOUS
Status: DISCONTINUED | OUTPATIENT
Start: 2023-07-11 | End: 2023-07-11

## 2023-07-11 RX ORDER — KETOROLAC TROMETHAMINE 30 MG/ML
30 INJECTION, SOLUTION INTRAMUSCULAR; INTRAVENOUS ONCE
Status: COMPLETED | OUTPATIENT
Start: 2023-07-11 | End: 2023-07-11

## 2023-07-11 RX ORDER — OXYCODONE HYDROCHLORIDE 5 MG/1
5 TABLET ORAL
Status: DISCONTINUED | OUTPATIENT
Start: 2023-07-11 | End: 2023-07-11 | Stop reason: HOSPADM

## 2023-07-11 RX ORDER — PROPOFOL 10 MG/ML
VIAL (ML) INTRAVENOUS
Status: DISCONTINUED | OUTPATIENT
Start: 2023-07-11 | End: 2023-07-11

## 2023-07-11 RX ORDER — DEXAMETHASONE SODIUM PHOSPHATE 4 MG/ML
INJECTION, SOLUTION INTRA-ARTICULAR; INTRALESIONAL; INTRAMUSCULAR; INTRAVENOUS; SOFT TISSUE
Status: DISCONTINUED | OUTPATIENT
Start: 2023-07-11 | End: 2023-07-11

## 2023-07-11 RX ORDER — MUPIROCIN 20 MG/G
OINTMENT TOPICAL
Status: DISCONTINUED | OUTPATIENT
Start: 2023-07-11 | End: 2023-07-11 | Stop reason: HOSPADM

## 2023-07-11 RX ORDER — NEOSTIGMINE METHYLSULFATE 1 MG/ML
INJECTION, SOLUTION INTRAVENOUS
Status: DISCONTINUED | OUTPATIENT
Start: 2023-07-11 | End: 2023-07-11

## 2023-07-11 RX ORDER — ACETAMINOPHEN 500 MG
1000 TABLET ORAL
Status: COMPLETED | OUTPATIENT
Start: 2023-07-11 | End: 2023-07-11

## 2023-07-11 RX ORDER — ONDANSETRON 2 MG/ML
INJECTION INTRAMUSCULAR; INTRAVENOUS
Status: DISCONTINUED | OUTPATIENT
Start: 2023-07-11 | End: 2023-07-11

## 2023-07-11 RX ORDER — LIDOCAINE HYDROCHLORIDE 20 MG/ML
INJECTION INTRAVENOUS
Status: DISCONTINUED | OUTPATIENT
Start: 2023-07-11 | End: 2023-07-11

## 2023-07-11 RX ORDER — HYDROMORPHONE HYDROCHLORIDE 1 MG/ML
0.2 INJECTION, SOLUTION INTRAMUSCULAR; INTRAVENOUS; SUBCUTANEOUS EVERY 5 MIN PRN
Status: DISCONTINUED | OUTPATIENT
Start: 2023-07-11 | End: 2023-07-11 | Stop reason: HOSPADM

## 2023-07-11 RX ORDER — MIDAZOLAM HYDROCHLORIDE 1 MG/ML
INJECTION INTRAMUSCULAR; INTRAVENOUS
Status: DISCONTINUED | OUTPATIENT
Start: 2023-07-11 | End: 2023-07-11

## 2023-07-11 RX ORDER — CARBOXYMETHYLCELLULOSE SODIUM 10 MG/ML
GEL OPHTHALMIC
Status: DISCONTINUED | OUTPATIENT
Start: 2023-07-11 | End: 2023-07-11

## 2023-07-11 RX ORDER — FAMOTIDINE 10 MG/ML
INJECTION INTRAVENOUS
Status: DISCONTINUED | OUTPATIENT
Start: 2023-07-11 | End: 2023-07-11

## 2023-07-11 RX ORDER — ROCURONIUM BROMIDE 10 MG/ML
INJECTION, SOLUTION INTRAVENOUS
Status: DISCONTINUED | OUTPATIENT
Start: 2023-07-11 | End: 2023-07-11

## 2023-07-11 RX ORDER — BACITRACIN ZINC 500 UNIT/G
OINTMENT (GRAM) TOPICAL
Status: DISCONTINUED | OUTPATIENT
Start: 2023-07-11 | End: 2023-07-11 | Stop reason: HOSPADM

## 2023-07-11 RX ORDER — HALOPERIDOL 5 MG/ML
0.5 INJECTION INTRAMUSCULAR EVERY 10 MIN PRN
Status: DISCONTINUED | OUTPATIENT
Start: 2023-07-11 | End: 2023-07-11 | Stop reason: HOSPADM

## 2023-07-11 RX ORDER — KETAMINE HCL IN 0.9 % NACL 50 MG/5 ML
SYRINGE (ML) INTRAVENOUS
Status: DISCONTINUED | OUTPATIENT
Start: 2023-07-11 | End: 2023-07-11

## 2023-07-11 RX ADMIN — OXYCODONE HYDROCHLORIDE 5 MG: 5 TABLET ORAL at 12:07

## 2023-07-11 RX ADMIN — MUPIROCIN: 20 OINTMENT TOPICAL at 09:07

## 2023-07-11 RX ADMIN — FENTANYL CITRATE 25 MCG: 50 INJECTION INTRAMUSCULAR; INTRAVENOUS at 12:07

## 2023-07-11 RX ADMIN — HYDROMORPHONE HYDROCHLORIDE 0.2 MG: 1 INJECTION, SOLUTION INTRAMUSCULAR; INTRAVENOUS; SUBCUTANEOUS at 12:07

## 2023-07-11 RX ADMIN — FENTANYL CITRATE 100 MCG: 50 INJECTION, SOLUTION INTRAMUSCULAR; INTRAVENOUS at 10:07

## 2023-07-11 RX ADMIN — GLYCOPYRROLATE 0.4 MG: 0.2 INJECTION, SOLUTION INTRAMUSCULAR; INTRAVENOUS at 11:07

## 2023-07-11 RX ADMIN — HYDROMORPHONE HYDROCHLORIDE 0.2 MG: 1 INJECTION, SOLUTION INTRAMUSCULAR; INTRAVENOUS; SUBCUTANEOUS at 01:07

## 2023-07-11 RX ADMIN — Medication 20 MG: at 10:07

## 2023-07-11 RX ADMIN — ACETAMINOPHEN 1000 MG: 500 TABLET ORAL at 09:07

## 2023-07-11 RX ADMIN — FENTANYL CITRATE 50 MCG: 50 INJECTION, SOLUTION INTRAMUSCULAR; INTRAVENOUS at 10:07

## 2023-07-11 RX ADMIN — DEXAMETHASONE SODIUM PHOSPHATE 8 MG: 4 INJECTION, SOLUTION INTRAMUSCULAR; INTRAVENOUS at 10:07

## 2023-07-11 RX ADMIN — MIDAZOLAM HYDROCHLORIDE 2 MG: 1 INJECTION, SOLUTION INTRAMUSCULAR; INTRAVENOUS at 09:07

## 2023-07-11 RX ADMIN — PROPOFOL 100 MG: 10 INJECTION, EMULSION INTRAVENOUS at 10:07

## 2023-07-11 RX ADMIN — ROCURONIUM BROMIDE 50 MG: 10 INJECTION, SOLUTION INTRAVENOUS at 10:07

## 2023-07-11 RX ADMIN — CARBOXYMETHYLCELLULOSE SODIUM 4 DROP: 10 GEL OPHTHALMIC at 10:07

## 2023-07-11 RX ADMIN — KETOROLAC TROMETHAMINE 30 MG: 30 INJECTION, SOLUTION INTRAMUSCULAR; INTRAVENOUS at 12:07

## 2023-07-11 RX ADMIN — ONDANSETRON 4 MG: 2 INJECTION, SOLUTION INTRAMUSCULAR; INTRAVENOUS at 11:07

## 2023-07-11 RX ADMIN — NEOSTIGMINE METHYLSULFATE 4 MG: 1 INJECTION INTRAVENOUS at 11:07

## 2023-07-11 RX ADMIN — SODIUM CHLORIDE: 0.9 INJECTION, SOLUTION INTRAVENOUS at 09:07

## 2023-07-11 RX ADMIN — Medication 10 MG: at 10:07

## 2023-07-11 RX ADMIN — SODIUM CHLORIDE, SODIUM GLUCONATE, SODIUM ACETATE, POTASSIUM CHLORIDE, MAGNESIUM CHLORIDE, SODIUM PHOSPHATE, DIBASIC, AND POTASSIUM PHOSPHATE: .53; .5; .37; .037; .03; .012; .00082 INJECTION, SOLUTION INTRAVENOUS at 10:07

## 2023-07-11 RX ADMIN — CEFAZOLIN 2 G: 2 INJECTION, POWDER, FOR SOLUTION INTRAMUSCULAR; INTRAVENOUS at 10:07

## 2023-07-11 RX ADMIN — PROPOFOL 250 MG: 10 INJECTION, EMULSION INTRAVENOUS at 10:07

## 2023-07-11 RX ADMIN — FAMOTIDINE 20 MG: 10 INJECTION, SOLUTION INTRAVENOUS at 09:07

## 2023-07-11 RX ADMIN — MIDAZOLAM HYDROCHLORIDE 2 MG: 1 INJECTION, SOLUTION INTRAMUSCULAR; INTRAVENOUS at 10:07

## 2023-07-11 RX ADMIN — HALOPERIDOL LACTATE 0.5 MG: 5 INJECTION, SOLUTION INTRAMUSCULAR at 01:07

## 2023-07-11 RX ADMIN — LIDOCAINE HYDROCHLORIDE 100 MG: 20 INJECTION INTRAVENOUS at 10:07

## 2023-07-11 NOTE — ANESTHESIA PROCEDURE NOTES
Intubation    Date/Time: 7/11/2023 10:08 AM  Performed by: Yenni Rocha CRNA  Authorized by: Serafin Barclay MD     Intubation:     Induction:  Intravenous    Intubated:  Postinduction    Mask Ventilation:  Easy mask    Attempts:  1    Attempted By:  CRNA ([facility])    Method of Intubation:  Video laryngoscopy    Blade:  Isidro 3    Laryngeal View Grade: Grade I - full view of cords      Difficult Airway Encountered?: No      Complications:  None    Airway Device:  Oral endotracheal tube    Airway Device Size:  7.5    Style/Cuff Inflation:  Cuffed    Inflation Amount (mL):  8    Tube secured:  22    Secured at:  The lips    Placement Verified By:  Capnometry    Complicating Factors:  None    Findings Post-Intubation:  BS equal bilateral and atraumatic/condition of teeth unchanged

## 2023-07-11 NOTE — ANESTHESIA PREPROCEDURE EVALUATION
07/11/2023  José Miguel Hilton is a 42 y.o., male.      Pre-op Assessment    I have reviewed the Patient Summary Reports.     I have reviewed the Nursing Notes. I have reviewed the NPO Status.   I have reviewed the Medications.     Review of Systems  Anesthesia Hx:  No previous Anesthesia  Neg history of prior surgery.   Social:  Non-Smoker, Social Alcohol Use    Hematology/Oncology:     Oncology Normal   Hematology Comments: HIV carrier, (undetectable), CD4 is slightly low- see labs scanned into media tab   EENT/Dental:EENT/Dental Normal   Cardiovascular:  Cardiovascular Normal Exercise tolerance: good  Denies CAD.     Denies Angina.  Denies IBRAHIM.    Pulmonary:  Pulmonary Normal  Denies Asthma.  Denies Shortness of breath.    Renal/:   Creat = 1.54 on 4/28/2023,  GFR = 58   Hepatic/GI:  Hepatic/GI Normal  Denies GERD. Denies Liver Disease.    Musculoskeletal:   Rupture of distal biceps tendon, left,  DDD cervical spine Spine Disorders: cervical Degenerative disease    Neurological:  Neurology Normal  Denies Headaches. Denies Seizures.    Endocrine:  Endocrine Normal Denies Diabetes. Denies Hypothyroidism.    Psych:  Psychiatric Normal           Physical Exam  General: Well nourished and Cooperative    Airway:  Mallampati: II   Mouth Opening: Normal  Neck ROM: Normal ROM        Anesthesia Plan  Type of Anesthesia, risks & benefits discussed:    Anesthesia Type: Gen ETT, Gen Supraglottic Airway  Intra-op Monitoring Plan: Standard ASA Monitors  Post Op Pain Control Plan: multimodal analgesia and IV/PO Opioids PRN  Induction:  IV  Airway Plan: Video  Informed Consent: Informed consent signed with the Patient and all parties understand the risks and agree with anesthesia plan.  All questions answered.   ASA Score: 2    Ready For Surgery From Anesthesia Perspective.     .

## 2023-07-11 NOTE — PATIENT INSTRUCTIONS
POST-OPERATIVE DISCHARGE INSTRUCTIONS    Diet: Ice chips, clear liquids, and then diet as tolerated.  Drink plenty of liquids after surgery.  Ice the area at least three times a day (20 minutes per session) if possible.    Elevate the extremity above the level of the heart to help reduce swelling.  Remain non-weightberaing to your left arm with splint intact until further notice.  Pain medication can be taken every four to six hours as needed.  It is helpful to take pain medication prior to physical therapy. If pain is not controlled, please take 800 mg ibuprofen every 8 hours as needed unless contraindicated (NSAID allergy, kidney disease, heart disease, currently taking blood thinners, etc.).  Any activity that requires precise thinking or accuracy should be avoided for a minimum of 72 hours after surgery and while on narcotic pain medication.  This includes operating machinery and/or driving a vehicle.  All sutures will be removed approximately 10-14 days from the time of surgery. Leave steri-strips (skin tapes) in place until sutures are removed.  If skin glue is used instead of stitches, do not apply any ointments or solutions to the incision.  Keep the incision dry.  The skin glue will peel off in 3-4 weeks.  Dressing change directions, unless otherwise instructed:   ?  Open surgery (Biceps Tendon Repair):  Keep splint intact until first post operative appointment.  All casts, splints, braces, slings, crutches, abduction pillows, etc. are to be worn as instructed.  Steve Hose or Sequential Compression Devices are often used following surgery to help with swelling and prevent blood clots.  They can be removed for 2-3 hours daily as needed.  If the hose become uncomfortable after a few days, switch to an Ace Wrap if desired.  Keep all incisions clean and dry for 10-14 days.  A waterproof dressing ([facility] or [facility]) can be used to shower if not in a splint.  No bath, pool, or hot tub until instructed.  You should  notify our office if you notice any of the following:  A temperature greater than 101 F  Severe increased pain  Excessive drainage or redness of the incision  Calf swelling and pain  Chest pain

## 2023-07-11 NOTE — BRIEF OP NOTE
[facility]  Brief Operative Note    Surgery Date: 7/11/2023     Surgeon(s) and Role:     * Sidney Morris MD - Primary    Assisting Provider:     * Tuan Mcguire PA-C - First Assist    No resident or fellow was available throughout the entire procedure as a result it was medically necessary for Tuan Mcguire PA-C to perform first assistant duties.    Pre-Operative Diagnosis: Rupture of distal biceps tendon, left, initial encounter [S46.212A]    Post-Operative Diagnosis: Post-Op Diagnosis Codes:     * Rupture of distal biceps tendon, left, initial encounter [S46.212A]    Procedure(s) (LRB):  REPAIR, TENDON, BICEPS, DISTAL (Left)    Anesthesia: General    Operative Findings: See Op note.    Estimated Blood Loss: * No values recorded between 7/11/2023 10:30 AM and 7/11/2023 11:54 AM *         Specimens:   Specimen (24h ago, onward)      None              Discharge Note    OUTCOME: Patient tolerated treatment/procedure well without complication and is now ready for discharge.    DISPOSITION: Home or Self Care    FINAL DIAGNOSIS:  Post-Op Diagnosis Codes:     * Rupture of distal biceps tendon, left, initial encounter [S46.212A]    FOLLOWUP: In clinic    DISCHARGE INSTRUCTIONS: See attached.

## 2023-07-11 NOTE — OP NOTE
[facility])  Surgery Department  Operative Note    SUMMARY     Date of Procedure: 7/11/2023     Pre-Operative Diagnosis: Rupture of distal biceps tendon, left, initial encounter [S46.212A]    Post-Operative Diagnosis: Post-Op Diagnosis Codes:     * Rupture of distal biceps tendon, left, initial encounter [S46.212A]    Procedure: Procedure(s) (LRB):  REPAIR, TENDON, BICEPS, DISTAL (Left)     Surgeon(s) and Role:     * Sidney Morris MD - Primary    Assistant: Tuan Mcguire PA-C    No resident or fellow was available throughout the entire procedure as a result it was medically necessary for Tuan Mcguire PA-C to perform first assist duties.    Anesthesia: General    Complications:  None    Implants:   Implant Name Type Inv. Item Serial No.  Lot No. LRB No. Used Action   SYS IMPL PROXIMAL TENODESIS - [id number]  SYS IMPL PROXIMAL TENODESIS  [employer] [id number] Left 2 Implanted       Exam Under Anesthesia:  Deformity of the distal biceps    Indication for Procedure:  42 y.o. Male with a history of left elbow pain who injured his arm while working out.  When attempting to perform a pull up, he felt a tearing sensation within the crease of his elbow.  He had immediate discomfort and has noticed asymmetry of his biceps muscle.  He reports the pain has been minimal and he has good range of motion.  His history, physical imaging were consistent with a distal biceps tendon rupture.  Risks, benefits and alternatives were discussed with the patient prior to proceeding with surgery.    Surgery in Detail:  The patient was marked identified in the holding room.  He was brought back to the operating room placed in the supine position.  After general endotracheal anesthesia was administered the left upper extremity was prepped and draped usual sterile fashion for a distal biceps tendon repair.  Preoperative antibiotics were given within 1 hour the procedure.  A time-out was taken prior to starting.  The tourniquet was placed to 250 mm Hg.  Using a small antecubital fossa incision, the distal biceps tendon was identified after opening up the fascia.  Hematoma was evacuated.  The distal biceps was brought out the small antecubital fossa incision extensive debridement was necessary to removal of the tenosynovitis surrounding the distal biceps tendon as well as the thickening of the sheath.  This was debrided and the distal 10 mm of the biceps tendon was amputated.  We then ran 2 #2. Fiber loop sutures around the biceps tendon.  We then placed locking sutures with the distal for sutures to have 4 exiting limbs.  We placed a Glenys through the antecubital fossa out to the dorsal lateral forearm. A dorsal lateral muscle splitting approach was then performed at the site of the glenys down to the radial tuberosity.  We debrided the tuberosity in the remnant stump of the distal biceps tendon sheath.  Curettes were used to prepare a bony bleeding bed.  We then drilled 2 unicortical button holes under fluoroscopic guidance to assure anatomic positioning of the distal biceps tendon.  Using our passing limb the sutures from the distal biceps tendon were brought out through the dorsal lateral incision and the biceps tendon was reduced to the radial tuberosity.  We then passed the sutures through the unicortical buttons and placed 1 limb from each FiberLoop back through the distal biceps tendon to create a locking construct.  The distal biceps tendon was then reduced to the radial tuberosity in flexion and pronation.  Knots were then tied to secure the tendon down to bone.  Copious irrigation was then used tourniquet was let down and hemostasis was achieved.  Deep layer was closed with 0 Vicryl suture on the dorsal lateral side followed by 2-0 Vicryl suture and 3-0 Monocryl.  The antecubital fossa incision was closed with a 3-0 Monocryl suture. Steri-Strips,  Adaptic, bacitracin and 4 x 4  were used for dressings.  A posterior  slab splint was placed with a sling.  He was extubated and taken to recovery in satisfactory condition.     Post-Op Plan:  Distal biceps tendon protocol     Attestation: I was present and scrubbed for the entire procedure.     \

## 2023-07-11 NOTE — OPERATIVE NOTE ADDENDUM
Certification of Assistant at Surgery       Surgery Date: 7/11/2023     Participating Surgeons:  Surgeon(s) and Role:     * Sidney Morris MD - Primary    Assistant:   Tuan Mcgurie PA-C    No resident or fellow was available throughout the entire procedure as a result it was medically necessary for Tuan Mcguire PA-C to perform first assist duties.      Procedures:  Procedure(s) (LRB):  REPAIR, TENDON, BICEPS, DISTAL (Left)    Assistant Surgeon's Certification of Necessity:  I understand that section 1842 (b) (6) (d) of the Social Security Act generally prohibits Medicare Part B reasonable charge payment for the services of assistants at surgery in teaching hospitals when qualified residents are available to furnish such services. I certify that the services for which payment is claimed were medically necessary, and that no qualified resident was available to perform the services. I further understand that these services are subject to post-payment review by the Medicare carrier.      Tuan Mcguire PA-C    07/11/2023  12:01 PM

## 2023-07-11 NOTE — PLAN OF CARE
VSS. Patient able to tolerate oral liquids. Patient reports tolerable pain level for discharge. Patient/family received home medication per bedside delivery. Dressing intact. Thigh TEDs intact. Patient instructed not to wear DAVID hose without wearing closed-back shoes or  socks due to increased risk of falls, verbalized understanding. No distress noted. Patient states he is ready for discharge. Discharge instructions reviewed with patient and family, verbalized understanding. IV discontinued with catheter tip intact. Staff at bedside to help patient dress. Patient wheeled to lobby via staff.

## 2023-07-11 NOTE — TRANSFER OF CARE
"Anesthesia Transfer of Care Note    Patient: José Miguel Hilton    Procedure(s) Performed: Procedure(s) (LRB):  REPAIR, TENDON, BICEPS, DISTAL (Left)    Patient location: PACU    Anesthesia Type: general    Transport from OR: Transported from OR on 6-10 L/min O2 by face mask with adequate spontaneous ventilation    Post pain: adequate analgesia    Post assessment: no apparent anesthetic complications and tolerated procedure well    Post vital signs: stable    Level of consciousness: awake and alert    Nausea/Vomiting: no nausea/vomiting    Complications: none    Transfer of care protocol was followed      Last vitals:   Visit Vitals  /77 (BP Location: Right arm, Patient Position: Lying)   Pulse 95   Temp 36.4 °C (97.6 °F) (Temporal)   Resp 16   Ht 5' 11" (1.803 m)   Wt 84.4 kg (186 lb)   SpO2 97%   BMI 25.94 kg/m²     "

## 2023-07-12 NOTE — ANESTHESIA POSTPROCEDURE EVALUATION
Anesthesia Post Evaluation    Patient: José Miguel Hilton    Procedure(s) Performed: Procedure(s) (LRB):  REPAIR, TENDON, BICEPS, DISTAL (Left)    Final Anesthesia Type: general      Patient location during evaluation: PACU  Patient participation: Yes- Able to Participate  Level of consciousness: awake and alert  Post-procedure vital signs: reviewed and stable  Pain management: adequate  Airway patency: patent    PONV status at discharge: No PONV  Anesthetic complications: no      Cardiovascular status: blood pressure returned to baseline  Respiratory status: unassisted  Hydration status: euvolemic  Follow-up not needed.          Vitals Value Taken Time   /78 07/11/23 1317   Temp 36.6 °C (97.8 °F) 07/11/23 1300   Pulse 90 07/11/23 1331   Resp 24 07/11/23 1330   SpO2 94 % 07/11/23 1331   Vitals shown include unvalidated device data.      Event Time   Out of Recovery 13:25:00         Pain/Main Score: Pain Rating Prior to Med Admin: 6 (7/11/2023  1:10 PM)  Pain Rating Post Med Admin: 6 (7/11/2023  1:08 PM)  Main Score: 10 (7/11/2023 12:45 PM)

## 2023-07-17 DIAGNOSIS — Z98.890 S/P TENDON REPAIR: Primary | ICD-10-CM

## 2023-07-17 RX ORDER — TRAMADOL HYDROCHLORIDE 50 MG/1
50 TABLET ORAL EVERY 6 HOURS
Qty: 10 TABLET | Refills: 0 | Status: SHIPPED | OUTPATIENT
Start: 2023-07-17

## 2023-07-24 NOTE — PROGRESS NOTES
[facility]  Physical Therapy Initial Evaluation    Name: José Miguel Hilton  Clinic Number: [id number]    Therapy Diagnosis:   Encounter Diagnoses   Name Primary?    Rupture of distal biceps tendon, left, initial encounter     Decreased range of motion of left elbow     Muscle weakness of left upper extremity      Physician: Tuan Mcguire, HANSEL    Physician Orders: PT Eval and Treat . Distal biceps tendon protocol  Medical Diagnosis: S46.212A (ICD-10-CM) - Rupture of distal biceps tendon, left, initial encounter  Evaluation Date: 7/25/2023  Surgery Date: 7/11/2023  Authorization Period Expiration: 7/6/2024  Plan of Care Certification Period: 10/3/2023  Visit # / Visits authorized: 1/ 1 7/25 - post op week 2    Time In: 8:30 am  Time Out: 9:01 am  Total Billable Time separate from evaluation: 14 minutes    Precautions: Standard      Subjective   Date of onset: 6/26/2023  History of current condition - José Miguel reports: injuring his L UE while exercising on 6/26/23. S/p L distal biceps repair on 7/11/2023. Arrived to session with L UE in a sling and the posterior splint. States he has follow up appointment with MD tomorrow.       Past Medical History: otherwise healthy  José Miguel Hilton  has a past surgical history that includes repair, tendon, biceps, distal (Left, 7/11/2023).    José Miguel has a current medication list which includes the following prescription(s): dovato, doxycycline, esomeprazole, finasteride, hydrocodone-acetaminophen, tadalafil, and tramadol.    Review of patient's allergies indicates:  No Known Allergies     Imaging:  MRI studies: 6/27/2023  FINDINGS:  Bone: No fracture or infiltrative process.     Tendons: Common extensor tendon is intact.  Mild myotendinous edema noted at the common flexor tendon, possibly reactive or due to tendinosis.  There is a complete tear of the biceps tendon with 7 cm retraction (approximately 3 cm proximal to the joint line).  Less than 1 cm  stump noted at the bicipital tuberosity.  There is disruption of the lacertus fibrosus and significant regional soft tissue edema.  Brachialis and triceps brachii tendons are intact.  Muscle bulk is normal.     Ligaments: Unremarkable non-arthrographic examination.     Joints: Trace effusion.  No intra-articular bodies.  Cartilage is maintained.     Miscellaneous: Ulnar nerve demonstrates normal course, caliber, and signal.      Prior Therapy: none  Social History:  lives alone  Occupation: supervisor at [employer]  Prior Level of Function: I with amb and ADL  Current Level of Function: I with amb, limited with ADL with L UE    Pain:  Current 2/10, worst 2/10, best 1/10   Location: left arm  Description: itching, irritated  Aggravating Factors: twisting his hand, opening his hand, opening a jar, cooking, dressing, carrying, and lifting.  Easing Factors: rest and elevation    Radicular symptoms: none    Sleep is not disturbed. Sleeping position: side mostly    Patients structured exercise routine: cardio in the past week. Added leg work and abs  Exercise routine prior to onset: weight training and cardio 5-6 days a week     Pts goals: getting back to working out full time and increase strength    Objective   Wearing sling on L UE. Posterior splint still in place and secured with elastic wrap.    Postural examination in sitting:   - decreased lumbar lordosis  - forward head  - forward shoulders      Endurance is good.      CMS Impairment/Limitation/Restriction for FOTO Elbow Survey    Therapist reviewed FOTO scores for José Miguel Hilton on 7/25/2023.   FOTO documents entered into [employer] - see Media section.    Limitation Score: 65%  Category: Mobility    Current : CL = least 60% but < 80% impaired, limited or restricted  Goal: CJ = at least 20% but < 40% impaired, limited or restricted       TREATMENT   Treatment Time In: 8:47 am  Treatment Time Out: 9:01 am  Total Treatment time separate from Evaluation time:  "14 minutes    José Miguel received therapeutic exercises to develop strength and endurance for 14 minutes including:  Scap retractions 20 x 5"  Shoulder shrugs 20 x 5"  Ball squeezes 20 reps  Side lying shoulder ER 2 x 10 reps    Home Exercises Provided and Patient Education Provided     Education provided:   - Discussed the role of the PTA on the Rehab Team. Discussed patient will be seen by a physical therapist minimally every 6th visit or every 30 days prior to being seen by PTA. Also discussed the use of the [facility] Portal for communication.    Scap retractions  Shoulder shrugs  Ball squeezes  Wrist flex  Wrist ext    Written Home Exercises Provided: yes.  Exercises were reviewed and José Miguel was able to demonstrate them prior to the end of the session.  José Miguel demonstrated good  understanding of the education provided.     See EMR under Patient Instructions for exercises provided 7/25/2023.    Assessment   José Miguel is a 42 y.o. male referred to outpatient Physical Therapy with a medical diagnosis of S46.212A (ICD-10-CM) - Rupture of distal biceps tendon, left, initial encounter. Pt referred to OP PT following L distal biceps repair on 7/11/23. Pt still has posterior splint in place. Following up with surgeon tomorrow for removal of splint and brace to be placed on L UE. Will proceed in OP PT per protocol focusing on periscapular/rotator cuff strengthening, elbow ROM as allowed, and wrist and  strengthening to restore full ROM of L elbow to return to prior level of function. Will reassess and revise goals as needed.    Pt prognosis is Excellent.   Pt will benefit from skilled outpatient Physical Therapy to address the deficits stated above and in the chart below, provide pt/family education, and to maximize pt's level of independence.     Plan of care discussed with patient: Yes  Pt's spiritual, cultural and educational needs considered and patient is agreeable to the plan of care and goals as stated below: "     Anticipated Barriers for therapy: none    Medical Necessity is demonstrated by the following  History  Co-morbidities and personal factors that may impact the plan of care Co-morbidities:   none    Personal Factors:   no deficits     low   Examination  Body Structures and Functions, activity limitations and participation restrictions that may impact the plan of care Body Regions:   upper extremities    Body Systems:    ROM  strength    Participation Restrictions:   Progress per distal biceps repair protocol    Activity limitations:   Learning and applying knowledge  no deficits    General Tasks and Commands  no deficits    Communication  no deficits    Mobility  lifting and carrying objects    Self care  washing oneself (bathing, drying, washing hands)  dressing    Domestic Life  cooking    Interactions/Relationships  no deficits    Life Areas  no deficits    Community and Social Life  recreation and leisure         high   Clinical Presentation evolving clinical presentation with changing clinical characteristics low   Decision Making/ Complexity Score: low     Goals:  Short Term Goals: 6 weeks   Independent with HEP.  Increase L elbow ROM  deg.      Long Term Goals: 12 weeks   Increase L elbow to full ROM  Report decreased L UE pain < or =  3/10 with adls such as twisting his hand, opening his hand, opening a jar, cooking, dressing.  Increased MMT for L UE to 4/5 muscle grade to promote proper scapular stability to decrease L UE pain < or =  3/10 with adls such as twisting his hand, opening his hand, opening a jar, cooking, dressing.   Patient to achieve CL (at least 60% < 80% impaired, limited or restricted) level on the FOTO Outcomes Measurement System.    Plan   Certification Period/Plan of care expiration: 7/25/2023 to 10/3/2023.    Outpatient Physical Therapy 2 times weekly for 12 weeks to include the following interventions: Manual Therapy, Moist Heat/ Ice, Neuromuscular Re-ed, Patient Education,  Therapeutic Activities, and Therapeutic Exercise.     Braydon Velazquez, PT

## 2023-07-25 ENCOUNTER — CLINICAL SUPPORT (OUTPATIENT)
Dept: REHABILITATION | Facility: OTHER | Age: 43
End: 2023-07-25
Payer: COMMERCIAL

## 2023-07-25 ENCOUNTER — PATIENT MESSAGE (OUTPATIENT)
Dept: REHABILITATION | Facility: OTHER | Age: 43
End: 2023-07-25

## 2023-07-25 DIAGNOSIS — M25.622 DECREASED RANGE OF MOTION OF LEFT ELBOW: ICD-10-CM

## 2023-07-25 DIAGNOSIS — M62.81 MUSCLE WEAKNESS OF LEFT UPPER EXTREMITY: ICD-10-CM

## 2023-07-25 DIAGNOSIS — S46.212A RUPTURE OF DISTAL BICEPS TENDON, LEFT, INITIAL ENCOUNTER: ICD-10-CM

## 2023-07-25 PROCEDURE — 97110 THERAPEUTIC EXERCISES: CPT | Mod: PN

## 2023-07-25 PROCEDURE — 97161 PT EVAL LOW COMPLEX 20 MIN: CPT | Mod: PN

## 2023-07-25 NOTE — PATIENT INSTRUCTIONS
SCAPULAR RETRACTIONS        Move your shoulder blades back and down. Hold, relax and repeat.     Hold for 5 seconds. Perform 20 reps.    Copyright © 2023 [employer]     BALL SQUEEZE        With an elastic ball, firmly squeeze it in the palm of your hand.    Hold for 5 seconds. Perform 20 reps.    Copyright © 2023 [employer]       SHOULDER SHRUGS        Sitting with your back supported in a chair.    Gently shrug your shoulders up towards your ears, and then slowly back down.  Try to keep your back supported throughout the exercise.    Hold for 5 seconds. Perform 20 reps.    Copyright © 2023 [employer]     WRIST EXTENSION AROM - TABLE        Rest your forearm on a table and bend your wrist up and down with your palm face down as shown.    Hold for 5 seconds. Perform 2 sets of 10 reps.    Copyright © 2023 [employer]     WRIST FLEXION AROM - TABLE        Rest your forearm on a table and bend your wrist up and down with your palm face up as shown    Hold for 5 seconds. Perform 2 sets of 10 reps.    Copyright © 2023 [employer]

## 2023-07-26 ENCOUNTER — OFFICE VISIT (OUTPATIENT)
Dept: SPORTS MEDICINE | Facility: CLINIC | Age: 43
End: 2023-07-26
Payer: COMMERCIAL

## 2023-07-26 VITALS
HEART RATE: 94 BPM | DIASTOLIC BLOOD PRESSURE: 74 MMHG | WEIGHT: 186 LBS | HEIGHT: 71 IN | BODY MASS INDEX: 26.04 KG/M2 | SYSTOLIC BLOOD PRESSURE: 136 MMHG

## 2023-07-26 DIAGNOSIS — S46.212A RUPTURE OF DISTAL BICEPS TENDON, LEFT, INITIAL ENCOUNTER: ICD-10-CM

## 2023-07-26 DIAGNOSIS — Z98.890 S/P TENDON REPAIR: Primary | ICD-10-CM

## 2023-07-26 PROCEDURE — 99999 PR PBB SHADOW E&M-EST. PATIENT-LVL III: ICD-10-PCS | Mod: PBBFAC,,, | Performed by: ORTHOPAEDIC SURGERY

## 2023-07-26 PROCEDURE — 3078F PR MOST RECENT DIASTOLIC BLOOD PRESSURE < 80 MM HG: ICD-10-PCS | Mod: CPTII,S$GLB,, | Performed by: ORTHOPAEDIC SURGERY

## 2023-07-26 PROCEDURE — 1159F MED LIST DOCD IN RCRD: CPT | Mod: CPTII,S$GLB,, | Performed by: ORTHOPAEDIC SURGERY

## 2023-07-26 PROCEDURE — 99024 POSTOP FOLLOW-UP VISIT: CPT | Mod: S$GLB,,, | Performed by: ORTHOPAEDIC SURGERY

## 2023-07-26 PROCEDURE — 3008F BODY MASS INDEX DOCD: CPT | Mod: CPTII,S$GLB,, | Performed by: ORTHOPAEDIC SURGERY

## 2023-07-26 PROCEDURE — 99024 PR POST-OP FOLLOW-UP VISIT: ICD-10-PCS | Mod: S$GLB,,, | Performed by: ORTHOPAEDIC SURGERY

## 2023-07-26 PROCEDURE — 3008F PR BODY MASS INDEX (BMI) DOCUMENTED: ICD-10-PCS | Mod: CPTII,S$GLB,, | Performed by: ORTHOPAEDIC SURGERY

## 2023-07-26 PROCEDURE — 3078F DIAST BP <80 MM HG: CPT | Mod: CPTII,S$GLB,, | Performed by: ORTHOPAEDIC SURGERY

## 2023-07-26 PROCEDURE — 3075F SYST BP GE 130 - 139MM HG: CPT | Mod: CPTII,S$GLB,, | Performed by: ORTHOPAEDIC SURGERY

## 2023-07-26 PROCEDURE — 3075F PR MOST RECENT SYSTOLIC BLOOD PRESS GE 130-139MM HG: ICD-10-PCS | Mod: CPTII,S$GLB,, | Performed by: ORTHOPAEDIC SURGERY

## 2023-07-26 PROCEDURE — 99999 PR PBB SHADOW E&M-EST. PATIENT-LVL III: CPT | Mod: PBBFAC,,, | Performed by: ORTHOPAEDIC SURGERY

## 2023-07-26 PROCEDURE — 1159F PR MEDICATION LIST DOCUMENTED IN MEDICAL RECORD: ICD-10-PCS | Mod: CPTII,S$GLB,, | Performed by: ORTHOPAEDIC SURGERY

## 2023-07-26 NOTE — PROGRESS NOTES
"POST-OPERATIVE EXAMINATION    42 y.o. Male who returns for follow after surgery. He is 2 weeks s/p    Procedure: Procedure(s) (LRB):  REPAIR, TENDON, BICEPS, DISTAL (Left)     He is doing well without any issues.       PHYSICAL EXAMINATION:  /74   Pulse 94   Ht 5' 11" (1.803 m)   Wt 84.4 kg (186 lb)   BMI 25.94 kg/m²   General: Well-developed well-nourished 42 y.o. malein no acute distress   Cardiovascular: Regular rhythm   Lungs: No labored breathing or wheezing appreciated   Neuro: Alert and oriented ×3   Psychiatric: well oriented to person, place and time, demonstrates normal mood and affect   Skin: No rashes, lesions or ulcers, normal temperature, turgor, and texture on involved extremity    ORTHOPEDIC EXAM:  Normal post-operative swelling  Normal post-operative scarring  Strength: WNL  ROM:   Tests: None today    ASSESSMENT:      ICD-10-CM ICD-9-CM   1. S/P tendon repair  Z98.890 V45.89   2. Rupture of distal biceps tendon, left, initial encounter  S46.212A 840.8       PLAN:       Post-operative splint removed  Sutures removed  Patient may begin physical therapy  RTC in 1 month with Tuan Mcguire PA-C with left elbow x-rays.  He will then return to my clinic 2 months later with repeat x-rays of the left elbow.            "

## 2023-07-31 NOTE — PROGRESS NOTES
"[facility]   Physical Therapy Treatment Note     Name: José Miguel Hilton  Clinic Number: [id number]    Therapy Diagnosis:   Encounter Diagnoses   Name Primary?    Decreased range of motion of left elbow Yes    Muscle weakness of left upper extremity      Physician: Tuan Mcguire PA-C    Visit Date: 8/1/2023    Physician Orders: PT Eval and Treat . Distal biceps tendon protocol  Medical Diagnosis: S46.212A (ICD-10-CM) - Rupture of distal biceps tendon, left, initial encounter  Evaluation Date: 7/25/2023  Authorization Period Expiration: 12/31/2023  Plan of Care Certification Period: 7/11/2023 - 10/03/2023  Visit # / Visits authorized: 1/ 20 (+ eval)     Progress Note Due: 8/25/2023   FOTO: 7/25/2023 (2/5; 1)  PTA Visit #: 0/5 8/1 - post op week 3       Precautions: Standard    Time In: 10:00 am  Time Out: 10:48 am  Total Billable Time: 45 minutes      SUBJECTIVE     Pt reports: he is feeling okay.  His brace is very uncomfortable and he admits to moving it around and changing since he was experiencing some numbness into his thumb that resolved with his arm out of the brace and resting.  He also admits to lifting and grabbing things with the left UPPER EXTREMITY but then catches himself to switch to the right arm.   He was compliant with home exercise program.  Response to previous treatment: no adverse effects  Functional change: none    Pain: 0/10  Location: left arm/elbow      OBJECTIVE     Objective Measures updated at progress report unless specified.       TREATMENT     José Miguel received the treatments listed in bold below:      received therapeutic exercises to develop strength and ROM for 25  minutes including:  Scap retractions 20 x 5"  Shoulder shrugs 20 x 5"  Wrist flexion/extension AROM x15; with 1# x15  Wrist radial/ulnar deviation x30  PROM supination/pronation with elbow at approx 90 degrees flexion    Ball squeezes 20 reps  Side lying shoulder ER 2 x 10 reps    Consider " resisted scap retraction and ROTATOR CUFF strengthening; posture exercises    received the following manual therapy techniques: Soft tissue Mobilization were applied to the: left foream/elbow for 00 minutes, including:       received the following dynamic functional therapeutic activities to improve functional performance for 20 minutes, including:  Red putty with fingers/wrist/hand x3 minutes   Hand  (black, 25) x3 minutes  Red digi  individual and combined flexion x3 minutes  Education regarding post op status, healing process and timeline  Brace adjustment with proper alignment     received the following neuromuscular re-education activities to improve: Coordination and Posture for 00 minutes. The following activities were included:         PATIENT EDUCATION AND HOME EXERCISES     Home Exercises Provided and Patient Education Provided     Education provided:   - post op status, healing process and timeline  - avoiding grabbing items with left UPPER EXTREMITY  - proper alignment of brace    Written Home Exercises Provided: yes. Exercises were reviewed and José Miguel was able to demonstrate them prior to the end of the session.  José Miguel demonstrated good  understanding of the education provided. See EMR under Patient Instructions for exercises provided during therapy sessions    ASSESSMENT     Patient reports mm fatigue in wrist and hand with exercises and activities today.  Gentle PROM initiated with patient having limited range past neutral into supination with elbow supported in approx 90 degrees elbow flexion to decrease stress on biceps tendon.  Continue with postural, ROTATOR CUFF, wrist and hand strengthening and elbow RANGE OF MOTION as tolerated per protocol.     José Miguel Is progressing well towards his goals.   Pt prognosis is Excellent.     Pt will continue to benefit from skilled outpatient physical therapy to address the deficits listed in the problem list box on initial evaluation, provide pt/family  education and to maximize pt's level of independence in the home and community environment.     Pt's spiritual, cultural and educational needs considered and pt agreeable to plan of care and goals.     Anticipated barriers to physical therapy: none    Goals:   Short Term Goals: 6 weeks   Independent with HEP.  Increase L elbow ROM  deg.        Long Term Goals: 12 weeks   Increase L elbow to full ROM  Report decreased L UE pain < or =  3/10 with adls such as twisting his hand, opening his hand, opening a jar, cooking, dressing.  Increased MMT for L UE to 4/5 muscle grade to promote proper scapular stability to decrease L UE pain < or =  3/10 with adls such as twisting his hand, opening his hand, opening a jar, cooking, dressing.   Patient to achieve CL (at least 60% < 80% impaired, limited or restricted) level on the FOTO Outcomes Measurement System.    PLAN     Continue with established PT POC and progress per pt tolerance.      Certification Period/Plan of care expiration: 7/25/2023 to 10/3/2023.     Outpatient Physical Therapy 2 times weekly for 12 weeks to include the following interventions: Manual Therapy, Moist Heat/ Ice, Neuromuscular Re-ed, Patient Education, Therapeutic Activities, and Therapeutic Exercise.     Nancy Borjas, PT

## 2023-08-01 ENCOUNTER — CLINICAL SUPPORT (OUTPATIENT)
Dept: REHABILITATION | Facility: OTHER | Age: 43
End: 2023-08-01
Payer: COMMERCIAL

## 2023-08-01 DIAGNOSIS — M62.81 MUSCLE WEAKNESS OF LEFT UPPER EXTREMITY: ICD-10-CM

## 2023-08-01 DIAGNOSIS — M25.622 DECREASED RANGE OF MOTION OF LEFT ELBOW: Primary | ICD-10-CM

## 2023-08-01 PROCEDURE — 97110 THERAPEUTIC EXERCISES: CPT | Mod: PN

## 2023-08-01 PROCEDURE — 97530 THERAPEUTIC ACTIVITIES: CPT | Mod: PN

## 2023-08-01 NOTE — PATIENT INSTRUCTIONS
Access Code: [email or web address]  URL: [email or web address]  Date: 08/01/2023  Prepared by: Nancy Borjas    Exercises  - Composite Flexion with Putty  - 1 x daily - 3-5 x weekly - 2 sets - 10 reps  - Finger Lumbricals with Putty  - 1 x daily - 3-5 x weekly - 2 sets - 10 reps  - Putty Squeezes  - 2-3 x daily - 2 sets - 1 reps - 1 minute hold  - Seated Gripping Towel  - 1 x daily - 3-5 x weekly - 2 sets - 10 reps  - Hand and Wrist AROM [employer]  - 1 x daily - 3-5 x weekly - 2 sets - 10 reps  - Wrist AROM Radial and Ulnar deviation  - 1 x daily - 3-5 x weekly - 2 sets - 10 reps

## 2023-08-09 NOTE — PROGRESS NOTES
"[facility]   Physical Therapy Treatment Note     Name: José Miguel Hilton  Clinic Number: [id number]    Therapy Diagnosis:   Encounter Diagnoses   Name Primary?    Decreased range of motion of left elbow Yes    Muscle weakness of left upper extremity        Physician: Tuan Mcguire PA-C    Visit Date: 8/10/2023    Physician Orders: PT Eval and Treat . Distal biceps tendon protocol  Medical Diagnosis: S46.212A (ICD-10-CM) - Rupture of distal biceps tendon, left, initial encounter  Evaluation Date: 7/25/2023  Authorization Period Expiration: 12/31/2023  Plan of Care Certification Period: 7/11/2023 - 10/03/2023  Visit # / Visits authorized: 2/ 20 (+ eval)     Progress Note Due: 8/25/2023   FOTO: 7/25/2023 (3/5; 1)  PTA Visit #: 0/5 8/8 - post op week 4       Precautions: Standard    Time In: 10:04 am   Time Out: 10:48 am  Total Billable Time: 25 minutes      SUBJECTIVE     Pt reports: he is feeling better every day.  He feels like his  strength is really improving.      He was compliant with home exercise program.  Response to previous treatment: no adverse effects  Functional change: none    Pain: 0/10  Location: left arm/elbow      OBJECTIVE     Objective Measures updated at progress report unless specified.       TREATMENT     José Miguel received the treatments listed in bold below:      received therapeutic exercises to develop strength and ROM for 15 minutes including:  Scap retractions 20 x 5"  Shoulder shrugs 20 x 5"  Wrist flexion/extension AROM with 1# 3x 10  Wrist radial/ulnar deviation in neutral forearm position x30  PROM supination/pronation with elbow at approx 90 degrees flexion    Ball squeezes 20 reps  Side lying shoulder ER 2 x 10 reps    Consider resisted scap retraction and ROTATOR CUFF strengthening; posture exercises    received the following manual therapy techniques: Soft tissue Mobilization were applied to the: left foream/elbow for 00 minutes, including:     "   received the following dynamic functional therapeutic activities to improve functional performance for 8 minutes, including:  Red putty with fingers/wrist/hand x3 minutes   Hand  (black, 25) x3 minutes  Red digi  individual and combined flexion x3 minutes  +Red finger web flexion/extension x30 each    received the following neuromuscular re-education activities to improve: Coordination and Posture for 20 minutes. The following activities were included:  All exercises performed with brace  +Prone row x30  +Prone horizontal ABD x30  +Prone extension x30   +Prone Flexion x30      PATIENT EDUCATION AND HOME EXERCISES     Home Exercises Provided and Patient Education Provided     Education provided:   - post op status, healing process and timeline  - avoiding grabbing items with left UPPER EXTREMITY  - proper alignment of brace    Written Home Exercises Provided: yes. Exercises were reviewed and José Miguel was able to demonstrate them prior to the end of the session.  José Miguel demonstrated good  understanding of the education provided. See EMR under Patient Instructions for exercises provided during therapy sessions    ASSESSMENT     Improved PROM supination noted today with reduced tension and easier movement through available range.  Patient reports less tightness as well.  Initiated scapular work without pain or difficulty, all exercises performed with brace and advised patient to continue scap work at home with brace as tolerated.      José Miguel Is progressing well towards his goals.   Pt prognosis is Excellent.     Pt will continue to benefit from skilled outpatient physical therapy to address the deficits listed in the problem list box on initial evaluation, provide pt/family education and to maximize pt's level of independence in the home and community environment.     Pt's spiritual, cultural and educational needs considered and pt agreeable to plan of care and goals.     Anticipated barriers to physical therapy:  none    Goals:   Short Term Goals: 6 weeks   Independent with HEP.  Increase L elbow ROM  deg.        Long Term Goals: 12 weeks   Increase L elbow to full ROM  Report decreased L UE pain < or =  3/10 with adls such as twisting his hand, opening his hand, opening a jar, cooking, dressing.  Increased MMT for L UE to 4/5 muscle grade to promote proper scapular stability to decrease L UE pain < or =  3/10 with adls such as twisting his hand, opening his hand, opening a jar, cooking, dressing.   Patient to achieve CL (at least 60% < 80% impaired, limited or restricted) level on the FOTO Outcomes Measurement System.    PLAN     Continue with established PT POC and progress per pt tolerance and distal biceps tendon repair protocol.     Certification Period/Plan of care expiration: 7/25/2023 to 10/3/2023.     Outpatient Physical Therapy 2 times weekly for 12 weeks to include the following interventions: Manual Therapy, Moist Heat/ Ice, Neuromuscular Re-ed, Patient Education, Therapeutic Activities, and Therapeutic Exercise.     Nancy Borjas, PT

## 2023-08-10 ENCOUNTER — CLINICAL SUPPORT (OUTPATIENT)
Dept: REHABILITATION | Facility: OTHER | Age: 43
End: 2023-08-10
Payer: COMMERCIAL

## 2023-08-10 DIAGNOSIS — M62.81 MUSCLE WEAKNESS OF LEFT UPPER EXTREMITY: ICD-10-CM

## 2023-08-10 DIAGNOSIS — M25.622 DECREASED RANGE OF MOTION OF LEFT ELBOW: Primary | ICD-10-CM

## 2023-08-10 PROCEDURE — 97530 THERAPEUTIC ACTIVITIES: CPT | Mod: PN

## 2023-08-10 PROCEDURE — 97110 THERAPEUTIC EXERCISES: CPT | Mod: PN

## 2023-08-10 NOTE — PATIENT INSTRUCTIONS
Access Code: [email or web address]  URL: [email or web address]  Date: 08/10/2023  Prepared by: Nancy Borjas    Exercises  - Composite Flexion with Putty  - 1 x daily - 3-5 x weekly - 2 sets - 10 reps  - Finger Lumbricals with Putty  - 1 x daily - 3-5 x weekly - 2 sets - 10 reps  - Putty Squeezes  - 2-3 x daily - 2 sets - 1 reps - 1 minute hold  - Seated Gripping Towel  - 1 x daily - 3-5 x weekly - 2 sets - 10 reps  - Hand and Wrist AROM Tenodesis  - 1 x daily - 3-5 x weekly - 2 sets - 10 reps  - Wrist AROM Radial and Ulnar deviation  - 1 x daily - 3-5 x weekly - 2 sets - 10 reps  - Prone Shoulder Row  - 1 x daily - 3 sets - 10 reps  - Prone Elbow Extension  - 1 x daily - 3-5 x weekly - 2 sets - 10 reps  - Prone Shoulder Horizontal Abduction  - 1 x daily - 3 sets - 10 reps - 3 second hold  - Prone Shoulder Flexion  - 1 x daily - 3-5 x weekly - 3 sets - 10 reps

## 2023-08-14 ENCOUNTER — TELEPHONE (OUTPATIENT)
Dept: SPORTS MEDICINE | Facility: CLINIC | Age: 43
End: 2023-08-14
Payer: COMMERCIAL

## 2023-08-14 ENCOUNTER — PATIENT MESSAGE (OUTPATIENT)
Dept: SPORTS MEDICINE | Facility: CLINIC | Age: 43
End: 2023-08-14
Payer: COMMERCIAL

## 2023-08-14 NOTE — TELEPHONE ENCOUNTER
Left a message to let patient  José Miguel know that Tuan has an Dr. Appt on 08/16/23 and wanted to see if I can reschedule appt to 08/17/23.  I can be reach at [phone].

## 2023-08-15 ENCOUNTER — CLINICAL SUPPORT (OUTPATIENT)
Dept: REHABILITATION | Facility: OTHER | Age: 43
End: 2023-08-15
Payer: COMMERCIAL

## 2023-08-15 DIAGNOSIS — M25.622 DECREASED RANGE OF MOTION OF LEFT ELBOW: Primary | ICD-10-CM

## 2023-08-15 DIAGNOSIS — M62.81 MUSCLE WEAKNESS OF LEFT UPPER EXTREMITY: ICD-10-CM

## 2023-08-15 PROCEDURE — 97110 THERAPEUTIC EXERCISES: CPT | Mod: PN

## 2023-08-15 PROCEDURE — 97530 THERAPEUTIC ACTIVITIES: CPT | Mod: PN

## 2023-08-15 PROCEDURE — 97112 NEUROMUSCULAR REEDUCATION: CPT | Mod: PN

## 2023-08-15 NOTE — PROGRESS NOTES
"[facility]   Physical Therapy Treatment Note     Name: José Miguel Hilton  Clinic Number: [id number]    Therapy Diagnosis:   Encounter Diagnoses   Name Primary?    Decreased range of motion of left elbow Yes    Muscle weakness of left upper extremity        Physician: Tuan Mcguire PA-C    Visit Date: 8/15/2023    Physician Orders: PT Eval and Treat . Distal biceps tendon protocol  Medical Diagnosis: S46.212A (ICD-10-CM) - Rupture of distal biceps tendon, left, initial encounter  Evaluation Date: 7/25/2023  Authorization Period Expiration: 12/31/2023  Plan of Care Certification Period: 7/11/2023 - 10/03/2023  Visit # / Visits authorized: 3/ 20 (+ eval)     Progress Note Due: 8/25/2023   FOTO: 7/25/2023 (4/5; 1)  PTA Visit #: 0/5     8/8 - post op week 4       Precautions: Standard    Time In: 10:02 am    Time Out: 10:45 am   Total Billable Time: 40 minutes      SUBJECTIVE     Pt reports: he is feeling much better,  strength continues to improve.  He is ready to get out of the brace. His next MD appointment is later this week.      He was compliant with home exercise program.  Response to previous treatment: no adverse effects  Functional change: none    Pain: 0/10  Location: left arm/elbow      OBJECTIVE     Objective Measures updated at progress report unless specified.       TREATMENT     José Miguel received the treatments listed in bold below:      received therapeutic exercises to develop strength and ROM for 12 minutes including:  Scap retractions 20 x 5"  Shoulder shrugs 20 x 5"  Wrist flexion/extension AROM with 1# 3x 10  Wrist radial/ulnar deviation in neutral forearm position with 1# weight x30  PROM supination/pronation with elbow at approx 90 degrees flexion    Ball squeezes 20 reps  Side lying shoulder ER 2 x 10 reps    Consider resisted scap retraction and ROTATOR CUFF strengthening; posture exercises    received the following manual therapy techniques: Soft tissue " Mobilization were applied to the: left foream/elbow for 00 minutes, including:       received the following dynamic functional therapeutic activities to improve functional performance for 8 minutes, including:  Red putty with fingers/wrist/hand x3 minutes   Hand  (black, 25) x3 minutes  Red digi  individual and combined flexion x3 minutes  Red finger web flexion/extension x30 each    received the following neuromuscular re-education activities to improve: Coordination and Posture for 20 minutes. The following activities were included:  All exercises performed with brace  Prone row 2x 20  Prone horizontal ABD 2x 20  Prone extension 2x 20  Prone Flexion 2x 20      PATIENT EDUCATION AND HOME EXERCISES     Home Exercises Provided and Patient Education Provided     Education provided:   - post op status, healing process and timeline  - avoiding grabbing items with left UPPER EXTREMITY  - proper alignment of brace    Written Home Exercises Provided: yes. Exercises were reviewed and José Miguel was able to demonstrate them prior to the end of the session.  José Miguel demonstrated good  understanding of the education provided. See EMR under Patient Instructions for exercises provided during therapy sessions    ASSESSMENT     Continues to improve with supination PROM although difficulty relaxing today with frequent cueing.  Progress as tolerated per protocol.      José Miguel Is progressing well towards his goals.   Pt prognosis is Excellent.     Pt will continue to benefit from skilled outpatient physical therapy to address the deficits listed in the problem list box on initial evaluation, provide pt/family education and to maximize pt's level of independence in the home and community environment.     Pt's spiritual, cultural and educational needs considered and pt agreeable to plan of care and goals.     Anticipated barriers to physical therapy: none    Goals:   Short Term Goals: 6 weeks   Independent with HEP.  Increase L elbow ROM   deg.        Long Term Goals: 12 weeks   Increase L elbow to full ROM  Report decreased L UE pain < or =  3/10 with adls such as twisting his hand, opening his hand, opening a jar, cooking, dressing.  Increased MMT for L UE to 4/5 muscle grade to promote proper scapular stability to decrease L UE pain < or =  3/10 with adls such as twisting his hand, opening his hand, opening a jar, cooking, dressing.   Patient to achieve CL (at least 60% < 80% impaired, limited or restricted) level on the FOTO Outcomes Measurement System.    PLAN     Continue with established PT POC and progress per pt tolerance and distal biceps tendon repair protocol.     Certification Period/Plan of care expiration: 7/25/2023 to 10/3/2023.     Outpatient Physical Therapy 2 times weekly for 12 weeks to include the following interventions: Manual Therapy, Moist Heat/ Ice, Neuromuscular Re-ed, Patient Education, Therapeutic Activities, and Therapeutic Exercise.     Nancy Borjas, PT

## 2023-08-16 NOTE — PROGRESS NOTES
"[facility]   Physical Therapy Treatment Note     Name: José Miguel Hilton  Clinic Number: [id number]    Therapy Diagnosis:   Encounter Diagnoses   Name Primary?    Decreased range of motion of left elbow Yes    Muscle weakness of left upper extremity      Physician: Tuan Mcguire PA-C    Visit Date: 8/17/2023    Physician Orders: PT Eval and Treat . Distal biceps tendon protocol  Medical Diagnosis: S46.212A (ICD-10-CM) - Rupture of distal biceps tendon, left, initial encounter  Evaluation Date: 7/25/2023  Authorization Period Expiration: 12/31/2023  Plan of Care Certification Period: 7/11/2023 - 10/03/2023  Visit # / Visits authorized: 4/ 20 (+ eval)     Progress Note Due: 8/25/2023   FOTO: 8/17/2023 (1/5; 2)  PTA Visit #: 0/5     8/15 - post op week 5       Precautions: Standard    Time In: 12:18 pm     Time Out: 1:00 pm    Total Billable Time: 42 minutes      SUBJECTIVE     Pt reports: he had his follow up with the MD this morning and everything looks good. He has about 2 more weeks in the brace per the doctor and should be out of it around 8 weeks post op.       He was compliant with home exercise program.  Response to previous treatment: no adverse effects  Functional change: none    Pain: 0/10  Location: left arm/elbow      OBJECTIVE     Objective Measures updated at progress report unless specified.   Left elbow:   Supination: 60  Pronation: 70     Right elbow: 0-130  Supination: 50 degrees  Pronation: 80 degrees    Intake Outcome Measure for FOTO Elbow Survey    Therapist reviewed FOTO scores for José Miguel Hilton on 8/17/2023.   FOTO documents entered into [employer] - see Media section.    Limitation Score: 48%          TREATMENT     José Miguel received the treatments listed in bold below:      received therapeutic exercises to develop strength and ROM for 15 minutes including:  Scap retractions 20 x 5"  Shoulder shrugs 20 x 5"  Wrist flexion/extension AROM with 1# 2x 20  Wrist " radial/ulnar deviation in neutral forearm position with 1# weight 2x 20  PROM supination/pronation with elbow at approx 90 degrees flexion  + Isometric tricep (gentle, 2 fingers) 30x 5 second holds    Ball squeezes 20 reps  Side lying shoulder ER 2 x 10 reps    Consider resisted scap retraction and ROTATOR CUFF strengthening; posture exercises    received the following manual therapy techniques: Soft tissue Mobilization were applied to the: left foream/elbow for 8 minutes, including:  Gentle STM to incision over lateral forearm      received the following dynamic functional therapeutic activities to improve functional performance for 19 minutes, including:  Red putty with fingers/wrist/hand x3 minutes   Hand  (black, 25) x3 minutes  Red digi  individual and combined flexion x3 minutes  Red finger web flexion/extension 2x 20 each  Re-assessment/FOTO     received the following neuromuscular re-education activities to improve: Coordination and Posture for 00 minutes. The following activities were included:  All exercises performed with brace  Prone row 2x 20  Prone horizontal ABD 2x 20  Prone extension 2x 20  Prone Flexion 2x 20      PATIENT EDUCATION AND HOME EXERCISES     Home Exercises Provided and Patient Education Provided     Education provided:   - post op status, healing process and timeline    Written Home Exercises Provided: yes. Exercises were reviewed and José Miguel was able to demonstrate them prior to the end of the session.  José Miguel demonstrated good  understanding of the education provided. See EMR under Patient Instructions for exercises provided during therapy sessions    ASSESSMENT     Patient notes overall improvement in  and hand strength.  With re-assessment, RANGE OF MOTION appears normal within current protocol limits.  Denies any pain with activities but notes tightness over incision on the lateral forearm that he has been hesitant to touch, tissue tension noted that improved with gentle  massage to the area.  Initiated gentle tricep isometric activation and consider additional ROTATOR CUFF isometrics at next visit.     José Miguel Is progressing well towards his goals.   Pt prognosis is Excellent.     Pt will continue to benefit from skilled outpatient physical therapy to address the deficits listed in the problem list box on initial evaluation, provide pt/family education and to maximize pt's level of independence in the home and community environment.     Pt's spiritual, cultural and educational needs considered and pt agreeable to plan of care and goals.     Anticipated barriers to physical therapy: none    Goals:   Short Term Goals: 6 weeks   Independent with HEP. MET  Increase L elbow ROM  deg. Great progress        Long Term Goals: 12 weeks   Increase L elbow to full RANGE OF MOTION. Great progress  Report decreased L UE pain < or =  3/10 with adls such as twisting his hand, opening his hand, opening a jar, cooking, dressing. Great progress  Increased MMT for L UE to 4/5 muscle grade to promote proper scapular stability to decrease L UE pain < or =  3/10 with adls such as twisting his hand, opening his hand, opening a jar, cooking, dressing.  Great progress  Patient to achieve CL (at least 60% < 80% impaired, limited or restricted) level on the FOTO Outcomes Measurement System. Great progress    PLAN     Continue with established PT POC and progress per pt tolerance and distal biceps tendon repair protocol.     Certification Period/Plan of care expiration: 7/25/2023 to 10/3/2023.     Outpatient Physical Therapy 2 times weekly for 12 weeks to include the following interventions: Manual Therapy, Moist Heat/ Ice, Neuromuscular Re-ed, Patient Education, Therapeutic Activities, and Therapeutic Exercise.     Nancy Borjas, PT

## 2023-08-17 ENCOUNTER — OFFICE VISIT (OUTPATIENT)
Dept: SPORTS MEDICINE | Facility: CLINIC | Age: 43
End: 2023-08-17
Payer: COMMERCIAL

## 2023-08-17 ENCOUNTER — CLINICAL SUPPORT (OUTPATIENT)
Dept: REHABILITATION | Facility: OTHER | Age: 43
End: 2023-08-17
Payer: COMMERCIAL

## 2023-08-17 ENCOUNTER — HOSPITAL ENCOUNTER (OUTPATIENT)
Dept: RADIOLOGY | Facility: HOSPITAL | Age: 43
Discharge: HOME | End: 2023-08-17
Attending: PHYSICIAN ASSISTANT
Payer: COMMERCIAL

## 2023-08-17 VITALS
WEIGHT: 185.19 LBS | HEART RATE: 79 BPM | BODY MASS INDEX: 25.93 KG/M2 | DIASTOLIC BLOOD PRESSURE: 74 MMHG | SYSTOLIC BLOOD PRESSURE: 126 MMHG | HEIGHT: 71 IN

## 2023-08-17 DIAGNOSIS — Z98.890 S/P TENDON REPAIR: Primary | ICD-10-CM

## 2023-08-17 DIAGNOSIS — M62.81 MUSCLE WEAKNESS OF LEFT UPPER EXTREMITY: ICD-10-CM

## 2023-08-17 DIAGNOSIS — M25.522 LEFT ELBOW PAIN: ICD-10-CM

## 2023-08-17 DIAGNOSIS — M25.622 DECREASED RANGE OF MOTION OF LEFT ELBOW: Primary | ICD-10-CM

## 2023-08-17 PROCEDURE — 99999 PR PBB SHADOW E&M-EST. PATIENT-LVL III: CPT | Mod: PBBFAC,,, | Performed by: PHYSICIAN ASSISTANT

## 2023-08-17 PROCEDURE — 97140 MANUAL THERAPY 1/> REGIONS: CPT | Mod: PN

## 2023-08-17 PROCEDURE — 1160F PR REVIEW ALL MEDS BY PRESCRIBER/CLIN PHARMACIST DOCUMENTED: ICD-10-PCS | Mod: CPTII,S$GLB,, | Performed by: PHYSICIAN ASSISTANT

## 2023-08-17 PROCEDURE — 3074F PR MOST RECENT SYSTOLIC BLOOD PRESSURE < 130 MM HG: ICD-10-PCS | Mod: CPTII,S$GLB,, | Performed by: PHYSICIAN ASSISTANT

## 2023-08-17 PROCEDURE — 99024 PR POST-OP FOLLOW-UP VISIT: ICD-10-PCS | Mod: S$GLB,,, | Performed by: PHYSICIAN ASSISTANT

## 2023-08-17 PROCEDURE — 97530 THERAPEUTIC ACTIVITIES: CPT | Mod: PN

## 2023-08-17 PROCEDURE — 1159F PR MEDICATION LIST DOCUMENTED IN MEDICAL RECORD: ICD-10-PCS | Mod: CPTII,S$GLB,, | Performed by: PHYSICIAN ASSISTANT

## 2023-08-17 PROCEDURE — 73080 X-RAY EXAM OF ELBOW: CPT | Mod: TC,LT

## 2023-08-17 PROCEDURE — 73080 XR ELBOW COMPLETE 3 VIEW LEFT: ICD-10-PCS | Mod: 26,LT,, | Performed by: RADIOLOGY

## 2023-08-17 PROCEDURE — 3008F PR BODY MASS INDEX (BMI) DOCUMENTED: ICD-10-PCS | Mod: CPTII,S$GLB,, | Performed by: PHYSICIAN ASSISTANT

## 2023-08-17 PROCEDURE — 1159F MED LIST DOCD IN RCRD: CPT | Mod: CPTII,S$GLB,, | Performed by: PHYSICIAN ASSISTANT

## 2023-08-17 PROCEDURE — 3008F BODY MASS INDEX DOCD: CPT | Mod: CPTII,S$GLB,, | Performed by: PHYSICIAN ASSISTANT

## 2023-08-17 PROCEDURE — 73080 X-RAY EXAM OF ELBOW: CPT | Mod: 26,LT,, | Performed by: RADIOLOGY

## 2023-08-17 PROCEDURE — 1160F RVW MEDS BY RX/DR IN RCRD: CPT | Mod: CPTII,S$GLB,, | Performed by: PHYSICIAN ASSISTANT

## 2023-08-17 PROCEDURE — 97110 THERAPEUTIC EXERCISES: CPT | Mod: PN

## 2023-08-17 PROCEDURE — 3078F PR MOST RECENT DIASTOLIC BLOOD PRESSURE < 80 MM HG: ICD-10-PCS | Mod: CPTII,S$GLB,, | Performed by: PHYSICIAN ASSISTANT

## 2023-08-17 PROCEDURE — 99999 PR PBB SHADOW E&M-EST. PATIENT-LVL III: ICD-10-PCS | Mod: PBBFAC,,, | Performed by: PHYSICIAN ASSISTANT

## 2023-08-17 PROCEDURE — 3078F DIAST BP <80 MM HG: CPT | Mod: CPTII,S$GLB,, | Performed by: PHYSICIAN ASSISTANT

## 2023-08-17 PROCEDURE — 3074F SYST BP LT 130 MM HG: CPT | Mod: CPTII,S$GLB,, | Performed by: PHYSICIAN ASSISTANT

## 2023-08-17 PROCEDURE — 99024 POSTOP FOLLOW-UP VISIT: CPT | Mod: S$GLB,,, | Performed by: PHYSICIAN ASSISTANT

## 2023-08-17 NOTE — PROGRESS NOTES
"POST-OPERATIVE EXAMINATION    42 y.o. Male who returns for follow after surgery. He is 5 weeks s/p    Procedure: Procedure(s) (LRB):  REPAIR, TENDON, BICEPS, DISTAL (Left)     He is doing well without any issues. Wearing the brace and attending therapy as instructed.      PHYSICAL EXAMINATION:  /74   Pulse 79   Ht 5' 11" (1.803 m)   Wt 84 kg (185 lb 3 oz)   BMI 25.83 kg/m²   General: Well-developed well-nourished 42 y.o. malein no acute distress   Cardiovascular: Regular rhythm   Lungs: No labored breathing or wheezing appreciated   Neuro: Alert and oriented ×3   Psychiatric: well oriented to person, place and time, demonstrates normal mood and affect   Skin: No rashes, lesions or ulcers, normal temperature, turgor, and texture on involved extremity    ORTHOPEDIC EXAM:  Normal post-operative swelling  Normal post-operative scarring  Strength: WNL  ROM:   Tests: None today    IMAGING:    X-rays including three views of the left elbow were completed today.  I have personally reviewed the images.  There is no evidence of heterotopic ossification.  The joint space appears to be well preserved.  The 2 metal buttons are in good position.    ASSESSMENT:      ICD-10-CM ICD-9-CM   1. S/P tendon repair  Z98.890 V45.89         PLAN:       Continue hinged elbow brace until week 8  Continue physical therapy  RTC in 2 months with Sidney Morris MD for repeat x-rays of the left elbow.              "

## 2023-08-29 ENCOUNTER — CLINICAL SUPPORT (OUTPATIENT)
Dept: REHABILITATION | Facility: OTHER | Age: 43
End: 2023-08-29
Payer: COMMERCIAL

## 2023-08-29 DIAGNOSIS — M25.622 DECREASED RANGE OF MOTION OF LEFT ELBOW: Primary | ICD-10-CM

## 2023-08-29 DIAGNOSIS — M62.81 MUSCLE WEAKNESS OF LEFT UPPER EXTREMITY: ICD-10-CM

## 2023-08-29 PROCEDURE — 97112 NEUROMUSCULAR REEDUCATION: CPT | Mod: PN

## 2023-08-29 NOTE — PROGRESS NOTES
"[facility]   Physical Therapy Treatment Note     Name: José Miguel Hilton  Clinic Number: [id number]    Therapy Diagnosis:   Encounter Diagnoses   Name Primary?    Decreased range of motion of left elbow Yes    Muscle weakness of left upper extremity        Physician: Tuan Mcguire PA-C    Visit Date: 8/29/2023    Physician Orders: Physical Therapy Evaluation and Treatreatent. Distal biceps tendon protocol  Medical Diagnosis: S46.212A (ICD-10-CM) - Rupture of distal biceps tendon, left, initial encounter  Evaluation Date: 7/25/2023  Authorization Period Expiration: 12/31/2023  Plan of Care Certification Period: 7/11/2023 - 10/03/2023  Visit # / Visits authorized: 5/ 20 (+ eval)     Progress Note Due: 8/25/2023   FOTO: 8/17/2023 (1/5; 2)  PTA Visit #: 0/5     8/15 - post op week 5       Precautions: Standard    Time In: 10:02 am      Time Out: 10:45 am   Total Billable Time: 15 minutes      SUBJECTIVE     Pt reports: he had a good trip in [place] last week.  He saw the doctor and was told to take the brace off next week.  He is feeling a little nervous with this but is also ready to get out of the brace.         He was compliant with home exercise program.  Response to previous treatment: no adverse effects  Functional change: none    Pain: 0/10  Location: left arm/elbow      OBJECTIVE     Objective Measures updated at progress report unless specified.     TREATMENT     José Miguel received the treatments listed below:      received therapeutic exercises to develop strength and ROM for 15 minutes including:  [] Scap retractions 20 x 5"  [] Shoulder shrugs 20 x 5"  [x] Wrist flexion/extension AROM with 1# 2x 20  [x] Wrist radial/ulnar deviation in neutral forearm position with 1# weight 2x 20  [x] PROM supination/pronation with elbow at approx 90 degrees flexion  [x] + Isometric tricep (gentle, 2 fingers) 30x 5 second holds  [] Ball squeezes 20 reps  [] Side lying shoulder ER 2 x 10 " reps    Consider resisted scap retraction and ROTATOR CUFF strengthening; posture exercises    received the following manual therapy techniques: Soft tissue Mobilization were applied to the: left foream/elbow for 00 minutes, including:  [] Gentle STM to incision over lateral forearm      received the following dynamic functional therapeutic activities to improve functional performance for 5 minutes, including:  [] Red putty with fingers/wrist/hand x3 minutes   [] Hand  (black, 25) x3 minutes  [] Red digi  individual and combined flexion x3 minutes  [x] Red finger web flexion/extension 2x 20 each    received the following neuromuscular re-education activities to improve: Coordination and Posture for 00 minutes. The following activities were included:  All exercises performed with brace  [x] Prone row 2x 20  [x] Prone horizontal ABD 2x 20  [x] Prone extension 2x 20  [x] Prone Flexion 2x 20  [x] + Prone scap retraction 2x 20   [x] + Supine alphabet (upper/lower case) x1 each          PATIENT EDUCATION AND HOME EXERCISES     Home Exercises Provided and Patient Education Provided     Education provided:   - post op status, healing process and timeline    Written Home Exercises Provided: yes. Exercises were reviewed and José Miguel was able to demonstrate them prior to the end of the session.  José Miguel demonstrated good  understanding of the education provided. See EMR under Patient Instructions for exercises provided during therapy sessions    ASSESSMENT   He went on his trip and admits to using his left arm to help secure luggage overhead but denies any pain or limitations with it.  Continues to have good passive range of motion at the elbow and forearm.  Initiated rotator cuff isometric work today and noted discomfort in the biceps with shoulder flexion that with modification for increased elbow flexion to reduce bicep activation.      José Miguel Is progressing well towards his goals.   Pt prognosis is Excellent.     Pt will  continue to benefit from skilled outpatient physical therapy to address the deficits listed in the problem list box on initial evaluation, provide pt/family education and to maximize pt's level of independence in the home and community environment.     Pt's spiritual, cultural and educational needs considered and pt agreeable to plan of care and goals.     Anticipated barriers to physical therapy: none    Goals:   Short Term Goals: 6 weeks   Independent with HEP. MET  Increase L elbow ROM  deg. Great progress        Long Term Goals: 12 weeks   Increase L elbow to full RANGE OF MOTION. Great progress  Report decreased L UE pain < or =  3/10 with adls such as twisting his hand, opening his hand, opening a jar, cooking, dressing. Great progress  Increased MMT for L UE to 4/5 muscle grade to promote proper scapular stability to decrease L UE pain < or =  3/10 with adls such as twisting his hand, opening his hand, opening a jar, cooking, dressing.  Great progress  Patient to achieve CL (at least 60% < 80% impaired, limited or restricted) level on the FOTO Outcomes Measurement System. Great progress    PLAN     Continue with established Physical therapy plan of care and progress per patient tolerance and distal biceps tendon repair protocol.     Certification Period/Plan of care expiration: 7/25/2023 to 10/3/2023.     Outpatient Physical Therapy 2 times weekly for 12 weeks to include the following interventions: Manual Therapy, Moist Heat/ Ice, Neuromuscular Re-ed, Patient Education, Therapeutic Activities, and Therapeutic Exercise.     Nancy Borjas, PT

## 2023-08-30 NOTE — PROGRESS NOTES
"[facility]   Physical Therapy Treatment Note     Name: José Miguel Youngblood  Clinic Number: [id number]    Therapy Diagnosis:   Encounter Diagnoses   Name Primary?    Decreased range of motion of left elbow Yes    Muscle weakness of left upper extremity        Physician: Tuan Mcguire PA-C    Visit Date: 8/31/2023    Physician Orders: Physical Therapy Evaluation and Treatreatent. Distal biceps tendon protocol  Medical Diagnosis: S46.212A (ICD-10-CM) - Rupture of distal biceps tendon, left, initial encounter  Evaluation Date: 7/25/2023  Authorization Period Expiration: 12/31/2023  Plan of Care Certification Period: 7/11/2023 - 10/03/2023  Visit # / Visits authorized: 6/ 20 (+ eval)       FOTO: 8/17/2023 (3/5; 2)  PTA Visit #: 0/5 8/29 - post op week 7     Precautions: Standard    Time In: 1215   Time Out: 1300  Total Billable Time: 45 minutes      SUBJECTIVE     Pt reports: had some tightness in his left upper extremity when he was traveling  . Did have some left hand swelling when he was in higher altitudes. Stated he started a new job (job promotion). Stated he will be out of the brace on Tuesday    He was compliant with home exercise program.  Response to previous treatment: no adverse effects  Functional change: pronating and supinating his hand is better. Feels better all around.    Pain: 0/10  Location: left arm/elbow      OBJECTIVE     Objective Measures updated at progress report unless specified.     TREATMENT     José Miguel received the treatments listed below:      received therapeutic exercises to develop strength and ROM for 25 minutes including:  [x] Scapular retractions 20 x 5"  [x] Shoulder shrugs 20 x 5"  [x] Wrist flexion/extension active range of motion with 2# 2x 20  [x] Wrist radial/ulnar deviation in neutral forearm position with 2# weight 2x 20  [x] Passive range of motion supination/pronation with elbow at approx 90 degrees flexion  [x]Isometric tricep (gentle, 2 fingers) 30 " x 5 second holds  []Ball squeezes 20 reps  [x]Side lying shoulder external rotation  2 x 10 repetitions with 2#    Consider resisted scapular retraction and rotator cuff strengthening; posture exercises    received the following manual therapy techniques: Soft tissue Mobilization were applied to the: left foream/elbow for 00 minutes, including:  [] Gentle soft tissue mobilization  to incision over lateral forearm      received the following dynamic functional therapeutic activities to improve functional performance for 5 minutes, including:  [] Red putty with fingers/wrist/hand x3 minutes   [] Hand  (black, 25) x3 minutes  [] Red digi- individual and combined flexion x3 minutes  [x] Red finger web flexion/extension 2x 20 each    received the following neuromuscular re-education activities to improve: Coordination and Posture for 15 minutes. The following activities were included:  All exercises performed with brace  [x] Prone row 2 x 20  [x] Prone horizontal abduction 2 x 20  [x] Prone extension 2 x 20  [x] Prone flexion 2 x 20  [x] Prone scapular retraction 2 x 20   [x] Supine alphabet (upper/lower case) x1 each          PATIENT EDUCATION AND HOME EXERCISES     Home Exercises Provided and Patient Education Provided     Education provided:   - post op status, healing process and timeline    Written Home Exercises Provided: yes. Exercises were reviewed and José Miguel was able to demonstrate them prior to the end of the session.  José Miguel demonstrated good  understanding of the education provided. See Electronic Medical Record under Patient Instructions for exercises provided during therapy sessions    ASSESSMENT   Continued with gentle upper extremity strengthening and postural reeducation today. Patient will progress next week to remove the brace and will progress exercises per protocol.    José Miguel Is progressing well towards his goals.   Pt prognosis is Excellent.     Pt will continue to benefit from skilled outpatient  physical therapy to address the deficits listed in the problem list box on initial evaluation, provide pt/family education and to maximize pt's level of independence in the home and community environment.     Pt's spiritual, cultural and educational needs considered and pt agreeable to plan of care and goals.     Anticipated barriers to physical therapy: none    Goals:   Short Term Goals: 6 weeks   Independent with home exercise program . MET  Increase L elbow range of motion  deg. Great progress        Long Term Goals: 12 weeks   Increase left elbow to full range of motion  . Great progress  Report decreased left upper extremity pain < or =  3/10 with adls such as twisting his hand, opening his hand, opening a jar, cooking, dressing. Great progress  Increased manual muscle test for left upper extremity to 4/5 muscle grade to promote proper scapular stability to decrease left upper extremity pain < or =  3/10 with activities of daily living such as twisting his hand, opening his hand, opening a jar, cooking, dressing.  Great progress  Patient to achieve CL (at least 60% < 80% impaired, limited or restricted) level on the Focus On Therapeutic Outcomes System. Great progress    PLAN     Continue with established Physical therapy plan of care and progress per patient tolerance and distal biceps tendon repair protocol.     Certification Period/Plan of care expiration: 7/25/2023 to 10/3/2023.     Outpatient Physical Therapy 2 times weekly for 12 weeks to include the following interventions: Manual Therapy, Moist Heat/ Ice, Neuromuscular Re-ed, Patient Education, Therapeutic Activities, and Therapeutic Exercise.     Braydon Velazquez, PT

## 2023-08-31 ENCOUNTER — DOCUMENTATION ONLY (OUTPATIENT)
Dept: REHABILITATION | Facility: OTHER | Age: 43
End: 2023-08-31
Payer: COMMERCIAL

## 2023-08-31 ENCOUNTER — CLINICAL SUPPORT (OUTPATIENT)
Dept: REHABILITATION | Facility: OTHER | Age: 43
End: 2023-08-31
Payer: COMMERCIAL

## 2023-08-31 ENCOUNTER — PATIENT MESSAGE (OUTPATIENT)
Dept: REHABILITATION | Facility: OTHER | Age: 43
End: 2023-08-31
Payer: COMMERCIAL

## 2023-08-31 DIAGNOSIS — M62.81 MUSCLE WEAKNESS OF LEFT UPPER EXTREMITY: ICD-10-CM

## 2023-08-31 DIAGNOSIS — M25.622 DECREASED RANGE OF MOTION OF LEFT ELBOW: Primary | ICD-10-CM

## 2023-08-31 PROCEDURE — 97110 THERAPEUTIC EXERCISES: CPT | Mod: PN

## 2023-08-31 PROCEDURE — 97112 NEUROMUSCULAR REEDUCATION: CPT | Mod: PN

## 2023-08-31 NOTE — PROGRESS NOTES
Patient failed to appear for scheduled PT appointment today at 10:45 am without prior notification or cancellation.    Nancy Borjas, PT, DPT  8/31/2023

## 2023-09-05 ENCOUNTER — CLINICAL SUPPORT (OUTPATIENT)
Dept: REHABILITATION | Facility: OTHER | Age: 43
End: 2023-09-05
Payer: COMMERCIAL

## 2023-09-05 DIAGNOSIS — M62.81 MUSCLE WEAKNESS OF LEFT UPPER EXTREMITY: ICD-10-CM

## 2023-09-05 DIAGNOSIS — M25.622 DECREASED RANGE OF MOTION OF LEFT ELBOW: Primary | ICD-10-CM

## 2023-09-05 PROCEDURE — 97110 THERAPEUTIC EXERCISES: CPT | Mod: PN

## 2023-09-05 NOTE — PROGRESS NOTES
"[facility]   Physical Therapy Treatment Note     Name: José Miguel Youngblood  Clinic Number: [id number]    Therapy Diagnosis:   Encounter Diagnoses   Name Primary?    Decreased range of motion of left elbow Yes    Muscle weakness of left upper extremity          Physician: Tuan Mcguire PA-C    Visit Date: 9/5/2023    Physician Orders: Physical Therapy Evaluation and Treatreatent. Distal biceps tendon protocol  Medical Diagnosis: S46.212A (ICD-10-CM) - Rupture of distal biceps tendon, left, initial encounter  Evaluation Date: 7/25/2023  Authorization Period Expiration: 12/31/2023  Plan of Care Certification Period: 7/11/2023 - 10/03/2023  Visit # / Visits authorized: 7/ 20 (+ eval)       FOTO: 8/17/2023 (4/5; 2)  PTA Visit #: 0/5 9/5 - post op week 8     Precautions: Standard    Time In: 10:01 am    Time Out: 10:45 am   Total Billable Time: 44 minutes      SUBJECTIVE     Pt reports: he is out of the brace today and is nervous.  He is feeling good but has had friends in town. He has been doing a lot this weekend entertaining.      He was compliant with home exercise program.  Response to previous treatment: no adverse effects  Functional change: pronating and supinating his hand is better. Feels better all around.    Pain: 0/10  Location: left arm/elbow      OBJECTIVE     Objective Measures updated at progress report unless specified.     TREATMENT     José Miguel received the treatments listed below:      received therapeutic exercises to develop strength and ROM for 39 minutes including:  [] Scapular retractions 20 x 5"  [] Shoulder shrugs 20 x 5"  [] Wrist flexion/extension active range of motion with 2# 2x 20  [] Wrist radial/ulnar deviation in neutral forearm position with 2# weight 2x 20  [x] Passive range of motion supination/pronation with elbow extended, elbow flexion/extension  [x]Isometric tricep and bicep (gentle, 2 fingers) activation in flexion, midrange, and extension 10x 5 second " holds each  []Ball squeezes 20 reps  []Side lying shoulder external rotation  2 x 10 repetitions with 2#  [x] Bicep curl 3x 10; with yellow therabar 3x 10  [x] Hammer curl 3x 10; with yellow therabar 3x 10  [x] Supination/pronation active range of motion 3x 10   [x] Supination/pronation with yellow therabar, forearm supported 3x 10    Consider resisted scapular retraction and rotator cuff strengthening; posture exercises    received the following manual therapy techniques: Soft tissue Mobilization were applied to the: left foream/elbow for 00 minutes, including:  [] Gentle soft tissue mobilization  to incision over lateral forearm      received the following dynamic functional therapeutic activities to improve functional performance for 5 minutes, including:  [] Therabar (Yellow) flexion/extension x30  [] Red putty with fingers/wrist/hand x3 minutes   [] Hand  (black, 25) x3 minutes  [] Red digi- individual and combined flexion x3 minutes  [] Red finger web flexion/extension 2x 20 each    received the following neuromuscular re-education activities to improve: Coordination and Posture for 00 minutes. The following activities were included:  All exercises performed with brace  [] Prone row 2 x 20  [] Prone horizontal abduction 2 x 20  [] Prone extension 2 x 20  [] Prone flexion 2 x 20  [] Prone scapular retraction 2 x 20   [] Supine alphabet (upper/lower case) x1 each          PATIENT EDUCATION AND HOME EXERCISES     Home Exercises Provided and Patient Education Provided     Education provided:   - post op status, healing process and timeline  - use of brace as needed if he finds himself trying to lift heavy things with surgical arm.     Written Home Exercises Provided: Patient instructed to cont prior HEP. Exercises were reviewed and José Miguel was able to demonstrate them prior to the end of the session.  José Miguel demonstrated good  understanding of the education provided. See Electronic Medical Record under Patient  Instructions for exercises provided during therapy sessions    ASSESSMENT   Initiated gentle active range of motion, isometrics and strengthening outside of the brace today.  Good tolerance although tightness with end range extension and supination/pronation passively.  Increased soreness with active range of motion supination/pronation outside of the brace.  Continue with gentle progressions as tolerated.  Education on avoiding lifting heavy objects with surgical arm and use of brace as needed if he feels like he is likely to lift heavy objects.       José Miguel Is progressing well towards his goals.   Pt prognosis is Excellent.     Patient will continue to benefit from skilled outpatient physical therapy to address the deficits listed in the problem list box on initial evaluation, provide pt/family education and to maximize patient's level of independence in the home and community environment.     Patient's spiritual, cultural and educational needs considered and pt agreeable to plan of care and goals.     Anticipated barriers to physical therapy: none    Goals:   Short Term Goals: 6 weeks   Independent with home exercise program . MET  Increase left elbow range of motion  deg. Great progress        Long Term Goals: 12 weeks   Increase left elbow to full range of motion  . Great progress  Report decreased left upper extremity pain < or =  3/10 with adls such as twisting his hand, opening his hand, opening a jar, cooking, dressing. Great progress  Increased manual muscle test for left upper extremity to 4/5 muscle grade to promote proper scapular stability to decrease left upper extremity pain < or =  3/10 with activities of daily living such as twisting his hand, opening his hand, opening a jar, cooking, dressing.  Great progress  Patient to achieve CL (at least 60% < 80% impaired, limited or restricted) level on the Focus On Therapeutic Outcomes System. Great progress    PLAN     Continue with established  Physical therapy plan of care and progress per patient tolerance and distal biceps tendon repair protocol.     Certification Period/Plan of care expiration: 7/25/2023 to 10/3/2023.     Outpatient Physical Therapy 2 times weekly for 12 weeks to include the following interventions: Manual Therapy, Moist Heat/ Ice, Neuromuscular Re-ed, Patient Education, Therapeutic Activities, and Therapeutic Exercise.     Nancy Borjas, PT

## 2023-09-07 ENCOUNTER — CLINICAL SUPPORT (OUTPATIENT)
Dept: REHABILITATION | Facility: OTHER | Age: 43
End: 2023-09-07
Payer: COMMERCIAL

## 2023-09-07 DIAGNOSIS — M62.81 MUSCLE WEAKNESS OF LEFT UPPER EXTREMITY: ICD-10-CM

## 2023-09-07 DIAGNOSIS — M25.622 DECREASED RANGE OF MOTION OF LEFT ELBOW: Primary | ICD-10-CM

## 2023-09-07 PROCEDURE — 97530 THERAPEUTIC ACTIVITIES: CPT | Mod: PN

## 2023-09-07 PROCEDURE — 97112 NEUROMUSCULAR REEDUCATION: CPT | Mod: PN

## 2023-09-07 PROCEDURE — 97110 THERAPEUTIC EXERCISES: CPT | Mod: PN

## 2023-09-07 NOTE — PROGRESS NOTES
"[facility]   Physical Therapy Treatment Note     Name: José Miguel Youngblood  Clinic Number: [id number]    Therapy Diagnosis:   Encounter Diagnoses   Name Primary?    Decreased range of motion of left elbow Yes    Muscle weakness of left upper extremity      Physician: Tuan Mcguire PA-C    Visit Date: 9/7/2023    Physician Orders: Physical Therapy Evaluation and Treatreatent. Distal biceps tendon protocol  Medical Diagnosis: S46.212A (ICD-10-CM) - Rupture of distal biceps tendon, left, initial encounter  Evaluation Date: 7/25/2023  Authorization Period Expiration: 12/31/2023  Plan of Care Certification Period: 7/11/2023 - 10/03/2023  Visit # / Visits authorized: 8/ 20 (+ eval)       Focus On therapeutic Outcomes (FOTO): 8/17/2023 (5/5; 2)  PTA Visit #: 0/5 9/5 - post op week 8     Precautions: Standard    Time In: 10:03 am    Time Out: 10:48 am   Total Billable Time: 45 minutes      SUBJECTIVE     Patient reports: he felt okay after last session no pain. He has been very conscious with trying not to lift heavy things with the left arm.      He was compliant with home exercise program.  Response to previous treatment: no adverse effects  Functional change: pronating and supinating his hand is better. Feels better all around.    Pain: 0/10  Location: left arm/elbow      OBJECTIVE     Objective Measures updated at progress report unless specified.     TREATMENT     José Miguel received the treatments listed below:      received therapeutic exercises to develop strength and ROM for 20 minutes including:  [] Scapular retractions 20 x 5"  [] Shoulder shrugs 20 x 5"  [x] Passive range of motion supination/pronation with elbow extended, elbow flexion/extension  []Isometric tricep and bicep (gentle, 2 fingers) activation in flexion, midrange, and extension 10x 5 second holds each  []Side lying shoulder external rotation  2 x 10 repetitions with 2#  [x] Bicep curl 2x 10 with 1# weight  [x] Hammer curl with " 1# weight 2x 10  [x] Supination/pronation with 1# weight 3x10  [x] Rows with red theraband x30  [x] Tricep extension with red theraband 2x 10  [x] Bicep curl with red theraband 2x 10   [x] Bent over row with red theraband 2x 10   [x] [place] with 1# weight x5   [x] Supine flexion Active Assisted Range of Motion with 1# dowel 2x 10      Consider resisted scapular retraction and rotator cuff strengthening; posture exercises    received the following manual therapy techniques: Soft tissue Mobilization were applied to the: left foream/elbow for 00 minutes, including:  [] Gentle soft tissue mobilization  to incision over lateral forearm      received the following dynamic functional therapeutic activities to improve functional performance for 10 minutes, including:  [x] Therabar (tan) flexion/extension elbows flexed x30  [x] Therabar (tan) flexion/extension elbows extended x30   [x] Yared twist with tan therabar x20 each     received the following neuromuscular re-education activities to improve: Coordination and Posture for 15 minutes. The following activities were included:  [] Prone row 2 x 20  [] Prone horizontal abduction 2 x 20  [] Prone extension 2 x 20  [] Prone flexion 2 x 20  [] Prone scapular retraction 2 x 20  [x] Supine alphabet (upper/lower case) x1 each with 1# weight    [x] Serratus punch with 1# dowel 3x 10  [x] Ball at wall closed kinetic chain alphabet upper/lower x1 each   [x]Ball at wall Closed kinetic chain circles CLOCKWISE/counterclockwise x20 each             PATIENT EDUCATION AND HOME EXERCISES     Home Exercises Provided and Patient Education Provided     Education provided:   - post operative status, healing process and timeline  - use of brace as needed if he finds himself trying to lift heavy things with surgical arm.     Written Home Exercises Provided: Patient instructed to cont prior HEP. Exercises were reviewed and José Miguel was able to demonstrate them prior to the end of the session.  José Miguel  demonstrated good  understanding of the education provided. See Electronic Medical Record under Patient Instructions for exercises provided during therapy sessions    ASSESSMENT   Continued with gentle progressions for bicep strengthening including weights and theraband resistance.  Overall challenged but no increase in pain.        José Miguel Is progressing well towards his goals.   Patient prognosis is Excellent.     Patient will continue to benefit from skilled outpatient physical therapy to address the deficits listed in the problem list box on initial evaluation, provide patient/family education and to maximize patient's level of independence in the home and community environment.     Patient's spiritual, cultural and educational needs considered and patient agreeable to plan of care and goals.     Anticipated barriers to physical therapy: none    Goals:   Short Term Goals: 6 weeks   Independent with home exercise program . MET  Increase left elbow range of motion  deg. Great progress        Long Term Goals: 12 weeks   Increase left elbow to full range of motion  . Great progress  Report decreased left upper extremity pain < or =  3/10 with adls such as twisting his hand, opening his hand, opening a jar, cooking, dressing. Great progress  Increased manual muscle test for left upper extremity to 4/5 muscle grade to promote proper scapular stability to decrease left upper extremity pain < or =  3/10 with activities of daily living such as twisting his hand, opening his hand, opening a jar, cooking, dressing.  Great progress  Patient to achieve CL (at least 60% < 80% impaired, limited or restricted) level on the Focus On Therapeutic Outcomes System. Great progress    PLAN     Continue with established Physical therapy plan of care and progress per patient tolerance and distal biceps tendon repair protocol.     Certification Period/Plan of care expiration: 7/25/2023 to 10/3/2023.     Outpatient Physical Therapy 2  times weekly for 12 weeks to include the following interventions: Manual Therapy, Moist Heat/ Ice, Neuromuscular Re-ed, Patient Education, Therapeutic Activities, and Therapeutic Exercise.     Nancy Borjas, PT

## 2023-09-11 NOTE — PROGRESS NOTES
"[facility]   Physical Therapy Treatment Note     Name: José Miguel Youngblood  Clinic Number: [id number]    Therapy Diagnosis:   Encounter Diagnoses   Name Primary?    Decreased range of motion of left elbow Yes    Muscle weakness of left upper extremity        Physician: Tuan Mcguire PA-C    Visit Date: 9/12/2023    Physician Orders: Physical Therapy Evaluation and Treatreatent. Distal biceps tendon protocol  Medical Diagnosis: S46.212A (ICD-10-CM) - Rupture of distal biceps tendon, left, initial encounter  Evaluation Date: 7/25/2023  Authorization Period Expiration: 12/31/2023  Plan of Care Certification Period: 7/11/2023 - 10/03/2023  Visit # / Visits authorized: 9/ 20 (+ eval)       Focus On therapeutic Outcomes (FOTO): 9/12/2023 (1/5; 2)  PTA Visit #: 0/5 9/12 - post op week 9     Precautions: Standard    Time In: 0700  Time Out: 0750  Total Billable Time: 50 minutes      SUBJECTIVE     Patient reports: he is having some soreness in his left shoulder and elbow.     He was compliant with home exercise program.  Response to previous treatment: good to start with light weights  Functional change:  strength is better    Pain: 1/10 occasionally  Location: left arm/elbow      OBJECTIVE     Objective Measures updated at progress report unless specified.     TREATMENT     José Miguel received the treatments listed below:      received therapeutic exercises to develop strength and ROM for 25 minutes including:  [x] Gentle left wrist flexor stretch 3 x 30"  [] Scapular retractions 20 x 5"  [] Shoulder shrugs 20 x 5"  [] Passive range of motion supination/pronation with elbow extended, elbow flexion/extension  []Isometric tricep and bicep (gentle, 2 fingers) activation in flexion, midrange, and extension 10x 5 second holds each  []Side lying shoulder external rotation  2 x 10 repetitions with 2#  [x] Bicep curl 2 x 10 with 1# weight  [x] Hammer curl with 1# weight 2 x 10  [x] Supination/pronation " with 1# weight 3 x 10  [x] Rows with red theraband x 30  [x] Tricep extension with red theraband 2 x 10  [x] Shoulder external rotation with red theratube 3 x 10 repetitions with shoulder adduction with thin green monster band  [x] Bicep curl with red theraband 2 x 10   [x] Bent over row with red theraband 2 x 10   [x] CHEYENNE (flexion/scaption/abduction) with 1# weight 2 x 5   [x] Supine flexion Active Assisted Range of Motion with 1# dowel 2 x 10      Consider resisted scapular retraction and rotator cuff strengthening; posture exercises    received the following manual therapy techniques: Soft tissue Mobilization were applied to the: left foream/elbow for 00 minutes, including:  [] Gentle soft tissue mobilization to incision over lateral forearm      received the following dynamic functional therapeutic activities to improve functional performance for 10 minutes, including:  [x] Therabar (tan) flexion/extension elbows flexed x30  [x] Therabar (tan) flexion/extension elbows extended x30   [x] Yared twist with tan therabar x20 each     received the following neuromuscular re-education activities to improve: Coordination and Posture for 15 minutes. The following activities were included:  [] Prone row 2 x 20  [] Prone horizontal abduction 2 x 20  [] Prone extension 2 x 20  [] Prone flexion 2 x 20  [] Prone scapular retraction 2 x 20  [x] Supine alphabet (upper/lower case) x1 each with 1# weight    [x] Serratus punch with 2#  3 x 10  [x] Ball at wall closed kinetic chain alphabet upper/lower x1 each   [x] Ball at wall Closed kinetic chain circles counter clockwise counterclockwise x20 each  [x] Serratus presses on wall 2 x 10 repetitions       PATIENT EDUCATION AND HOME EXERCISES     Home Exercises Provided and Patient Education Provided     Education provided:   - post operative status, healing process and timeline  - use of brace as needed if he finds himself trying to lift heavy things with surgical arm.     Written  Home Exercises Provided: Patient instructed to cont prior HEP. Exercises were reviewed and José Miguel was able to demonstrate them prior to the end of the session.  José Miguel demonstrated good  understanding of the education provided. See Electronic Medical Record under Patient Instructions for exercises provided during therapy sessions    ASSESSMENT   Continued with gentle strengthening and stretching. Experiencing lateral deltoid discomfort, particularly with shoulder abduction. Initiated shoulder band external rotation in standing and used thin monster band to promote adduction to promote better activate external rotators.          José Miguel Is progressing well towards his goals.   Patient prognosis is Excellent.     Patient will continue to benefit from skilled outpatient physical therapy to address the deficits listed in the problem list box on initial evaluation, provide patient/family education and to maximize patient's level of independence in the home and community environment.     Patient's spiritual, cultural and educational needs considered and patient agreeable to plan of care and goals.     Anticipated barriers to physical therapy: none    Goals:   Short Term Goals: 6 weeks   Independent with home exercise program . MET  Increase left elbow range of motion  deg. Great progress        Long Term Goals: 12 weeks   Increase left elbow to full range of motion  . Great progress  Report decreased left upper extremity pain < or =  3/10 with adls such as twisting his hand, opening his hand, opening a jar, cooking, dressing. Great progress  Increased manual muscle test for left upper extremity to 4/5 muscle grade to promote proper scapular stability to decrease left upper extremity pain < or =  3/10 with activities of daily living such as twisting his hand, opening his hand, opening a jar, cooking, dressing.  Great progress  Patient to achieve CL (at least 60% < 80% impaired, limited or restricted) level on the Focus On  Therapeutic Outcomes System. Great progress    PLAN     Continue with established Physical therapy plan of care and progress per patient tolerance and distal biceps tendon repair protocol.     Certification Period/Plan of care expiration: 7/25/2023 to 10/3/2023.     Outpatient Physical Therapy 2 times weekly for 12 weeks to include the following interventions: Manual Therapy, Moist Heat/ Ice, Neuromuscular Re-ed, Patient Education, Therapeutic Activities, and Therapeutic Exercise.     Braydon Velazquez, PT

## 2023-09-12 ENCOUNTER — CLINICAL SUPPORT (OUTPATIENT)
Dept: REHABILITATION | Facility: OTHER | Age: 43
End: 2023-09-12
Payer: COMMERCIAL

## 2023-09-12 DIAGNOSIS — M62.81 MUSCLE WEAKNESS OF LEFT UPPER EXTREMITY: ICD-10-CM

## 2023-09-12 DIAGNOSIS — M25.622 DECREASED RANGE OF MOTION OF LEFT ELBOW: Primary | ICD-10-CM

## 2023-09-12 PROCEDURE — 97110 THERAPEUTIC EXERCISES: CPT | Mod: PN

## 2023-09-12 PROCEDURE — 97530 THERAPEUTIC ACTIVITIES: CPT | Mod: PN

## 2023-09-12 PROCEDURE — 97112 NEUROMUSCULAR REEDUCATION: CPT | Mod: PN

## 2023-09-18 NOTE — PROGRESS NOTES
"[facility]   Physical Therapy Treatment Note     Name: José Miguel Youngblood  Clinic Number: [id number]    Therapy Diagnosis:   Encounter Diagnoses   Name Primary?    Decreased range of motion of left elbow Yes    Muscle weakness of left upper extremity        Physician: Tuan Mcguire PA-C    Visit Date: 9/19/2023    Physician Orders: Physical Therapy Evaluation and Treatreatent. Distal biceps tendon protocol  Medical Diagnosis: S46.212A (ICD-10-CM) - Rupture of distal biceps tendon, left, initial encounter  Evaluation Date: 7/25/2023  Authorization Period Expiration: 12/31/2023  Plan of Care Certification Period: 7/11/2023 - 10/03/2023  Visit # / Visits authorized: 10/ 20 (+ eval)       Focus On therapeutic Outcomes (FOTO): 9/12/2023 (2/5; 2)  PTA Visit #: 0/5 9/19 - post op week 10     Precautions: Standard    Time In: 0700  Time Out: 0747  Total Billable Time: 50 minutes      SUBJECTIVE     Patient reports: his back is hurting. Not sure what he did. "I feel like moving my arm has gotten better all around."    He was compliant with home exercise program.  Response to previous treatment: "It was good."  Functional change: lifting is easier    Pain: 1/10 occasionally  Location: left arm/elbow      OBJECTIVE     Objective Measures updated at progress report unless specified.     TREATMENT     José Miguel received the treatments listed below:      received therapeutic exercises to develop strength and ROM for 25 minutes including:  [x] Gentle left wrist flexor stretch 3 x 30"  [] Scapular retractions 20 x 5"  [] Shoulder shrugs 20 x 5"  [] Passive range of motion supination/pronation with elbow extended, elbow flexion/extension  []Isometric tricep and bicep (gentle, 2 fingers) activation in flexion, midrange, and extension 10x 5 second holds each  []Side lying shoulder external rotation  2 x 10 repetitions with 2#    [x] Bicep curl 2 x 10 with 1# weight  [x] Hammer curl with 1# weight 2 x 10  [x] " Supination/pronation with 1# weight 3 x 10    [x] Rows with green theraband x 30  [x] Tricep extension with green theraband 2 x 10  [x] Shoulder external rotation with green theratube 3 x 10 repetitions with shoulder adduction with thin green monster band    [x] Bicep curl with red theraband 2 x 10   [x] Bent over row with red theraband 2 x 10   [x] [place] (flexion/scaption/abduction) with 2# weight 2 x 10   [] Supine flexion Active Assisted Range of Motion with 1# dowel 2 x 10    Consider resisted scapular retraction and rotator cuff strengthening; posture exercises    received the following manual therapy techniques: Soft tissue Mobilization were applied to the: left foream/elbow for 00 minutes, including:  [] Gentle soft tissue mobilization to incision over lateral forearm      received the following dynamic functional therapeutic activities to improve functional performance for 10 minutes, including:  [x] Therabar (tan) flexion/extension elbows flexed x 30  [x] Therabar (tan) flexion/extension elbows extended x 30   [x] Yared twist with tan therabar x 20 each     received the following neuromuscular re-education activities to improve: Coordination and Posture for 10 minutes. The following activities were included:  [] Prone row 2 x 20  [] Prone horizontal abduction 2 x 20  [] Prone extension 2 x 20  [] Prone flexion 2 x 20  [] Prone scapular retraction 2 x 20  [] Supine alphabet (upper/lower case) x1 each with 1# weight    [] Serratus punch with 2#  3 x 10  [] Ball at wall closed kinetic chain alphabet upper/lower x1 each   [x] Ball at wall closed kinetic chain circles, forward and lateral, counter clockwise counterclockwise x 20 each  [x] Serratus presses on wall 2 x 10 repetitions       PATIENT EDUCATION AND HOME EXERCISES     Home Exercises Provided and Patient Education Provided     Education provided:   - post operative status, healing process and timeline  - use of brace as needed if he finds himself trying  to lift heavy things with surgical arm.     Written Home Exercises Provided: Patient instructed to cont prior HEP. Exercises were reviewed and José Miguel was able to demonstrate them prior to the end of the session.  José Miguel demonstrated good  understanding of the education provided. See Electronic Medical Record under Patient Instructions for exercises provided during therapy sessions    ASSESSMENT   Patient doing well with decreased pain in left upper extremity and increasing functional use. Will continue to progress with cuff and upper extremity resistance exercises per protocol.    José Miguel Is progressing well towards his goals.   Patient prognosis is Excellent.     Patient will continue to benefit from skilled outpatient physical therapy to address the deficits listed in the problem list box on initial evaluation, provide patient/family education and to maximize patient's level of independence in the home and community environment.     Patient's spiritual, cultural and educational needs considered and patient agreeable to plan of care and goals.     Anticipated barriers to physical therapy: none    Goals:   Short Term Goals: 6 weeks   Independent with home exercise program . MET  Increase left elbow range of motion  deg. Great progress        Long Term Goals: 12 weeks   Increase left elbow to full range of motion  . Great progress  Report decreased left upper extremity pain < or =  3/10 with adls such as twisting his hand, opening his hand, opening a jar, cooking, dressing. Great progress  Increased manual muscle test for left upper extremity to 4/5 muscle grade to promote proper scapular stability to decrease left upper extremity pain < or =  3/10 with activities of daily living such as twisting his hand, opening his hand, opening a jar, cooking, dressing.  Great progress  Patient to achieve CL (at least 60% < 80% impaired, limited or restricted) level on the Focus On Therapeutic Outcomes System. Great  progress    PLAN     Continue with established Physical therapy plan of care and progress per patient tolerance and distal biceps tendon repair protocol.     Certification Period/Plan of care expiration: 7/25/2023 to 10/3/2023.     Outpatient Physical Therapy 2 times weekly for 12 weeks to include the following interventions: Manual Therapy, Moist Heat/ Ice, Neuromuscular Re-ed, Patient Education, Therapeutic Activities, and Therapeutic Exercise.     Braydon Velazquez, PT

## 2023-09-19 ENCOUNTER — CLINICAL SUPPORT (OUTPATIENT)
Dept: REHABILITATION | Facility: OTHER | Age: 43
End: 2023-09-19
Payer: COMMERCIAL

## 2023-09-19 DIAGNOSIS — M62.81 MUSCLE WEAKNESS OF LEFT UPPER EXTREMITY: ICD-10-CM

## 2023-09-19 DIAGNOSIS — M25.622 DECREASED RANGE OF MOTION OF LEFT ELBOW: Primary | ICD-10-CM

## 2023-09-19 PROCEDURE — 97530 THERAPEUTIC ACTIVITIES: CPT | Mod: PN

## 2023-09-19 PROCEDURE — 97110 THERAPEUTIC EXERCISES: CPT | Mod: PN

## 2023-09-19 PROCEDURE — 97112 NEUROMUSCULAR REEDUCATION: CPT | Mod: PN

## 2023-09-20 NOTE — PROGRESS NOTES
"[facility]   Physical Therapy Treatment Note     Name: José Miguel Youngblood  Clinic Number: [id number]    Therapy Diagnosis:   Encounter Diagnoses   Name Primary?    Decreased range of motion of left elbow Yes    Muscle weakness of left upper extremity        Physician: Tuan Mcguire PA-C    Visit Date: 9/21/2023    Physician Orders: Physical Therapy Evaluation and Treatreatent. Distal biceps tendon protocol  Medical Diagnosis: S46.212A (ICD-10-CM) - Rupture of distal biceps tendon, left, initial encounter  Evaluation Date: 7/25/2023  Authorization Period Expiration: 12/31/2023  Plan of Care Certification Period: 7/11/2023 - 10/03/2023  Visit # / Visits authorized: 10/ 20 (+ eval)       Focus On therapeutic Outcomes (FOTO): 9/12/2023 (3/5; 2)  PTA Visit #: 0/5 9/19 - post op week 10     Precautions: Standard    Time In: 0702  Time Out: 0750  Total Billable Time: 48 minutes      SUBJECTIVE     Patient reports: connor is still having pinching in his back, particularly going up stairs.    He was compliant with home exercise program.  Response to previous treatment: no adverse effects  Functional change: increasing range of motion    Pain: 0/10   Location: left arm/elbow      OBJECTIVE     Objective Measures updated at progress report unless specified.     TREATMENT     José Miguel received the treatments listed below:      received therapeutic exercises to develop strength and ROM for 18 minutes including:  [x] Gentle left wrist flexor stretch 3 x 30"  [] Scapular retractions 20 x 5"  [] Shoulder shrugs 20 x 5"  [] Passive range of motion supination/pronation with elbow extended, elbow flexion/extension  [] Isometric tricep and bicep (gentle, 2 fingers) activation in flexion, midrange, and extension 10x 5 second holds each  [] Side lying shoulder external rotation  2 x 10 repetitions with 2#    [x] Bicep curl 2 x 10 with 2# weight  [x] Hammer curl with 2# weight 2 x 10  [x] Supination/pronation with 2# " weight 3 x 10    [x] Rows with green theraband x 30  [x]Shoulder extension x 30 repetitions with green theratube  [] Tricep extension with green theraband 2 x 10  [x] Shoulder external rotation with green theratube 3 x 10 repetitions with shoulder adduction with thin green monster band    [] Bicep curl with red theraband 2 x 10   [] Bent over row with red theraband 2 x 10   [x] [place] (flexion/scaption/abduction) with 2# weight 2 x 10   [] Supine flexion Active Assisted Range of Motion with 1# dowel 2 x 10    Consider resisted scapular retraction and rotator cuff strengthening; posture exercises    received the following manual therapy techniques: Soft tissue Mobilization were applied to the: left foream/elbow for 00 minutes, including:  [] Gentle soft tissue mobilization to incision over lateral forearm      received the following dynamic functional therapeutic activities to improve functional performance for 10 minutes, including:  [x] Therabar (tan) flexion/extension elbows flexed x 30  [x] Therabar (tan) flexion/extension elbows extended x 30   [x] Yared twist with tan therabar x 20 each     received the following neuromuscular re-education activities to improve: Coordination and Posture for 20 minutes. The following activities were included:  [x] Prone row 2 x 20 with 2#  [x] Prone horizontal abduction 2 x 20 with 23  [x] Prone extension 2 x 20 with 2#  [x] Prone flexion 2 x 20 with 2#  [] Prone scapular retraction 2 x 20    [x] Supine alphabet (upper/lower case) x1 each with 1# weight    [x] Serratus punch with 2#  3 x 10  [] Ball at wall closed kinetic chain alphabet upper/lower x1 each   [x] Ball at wall closed kinetic chain circles, forward and lateral, counter clockwise counterclockwise x 20 each  [x] Serratus presses on wall 2 x 10 repetitions       PATIENT EDUCATION AND HOME EXERCISES     Home Exercises Provided and Patient Education Provided     Education provided:   - post operative status, healing  process and timeline  - use of brace as needed if he finds himself trying to lift heavy things with surgical arm.     Written Home Exercises Provided: Patient instructed to cont prior HEP. Exercises were reviewed and José Miguel was able to demonstrate them prior to the end of the session.  José Miguel demonstrated good  understanding of the education provided. See Electronic Medical Record under Patient Instructions for exercises provided during therapy sessions    ASSESSMENT   Increased weight for exercises today without increased pain.     José Miguel Is progressing well towards his goals.   Patient prognosis is Excellent.     Patient will continue to benefit from skilled outpatient physical therapy to address the deficits listed in the problem list box on initial evaluation, provide patient/family education and to maximize patient's level of independence in the home and community environment.     Patient's spiritual, cultural and educational needs considered and patient agreeable to plan of care and goals.     Anticipated barriers to physical therapy: none    Goals:   Short Term Goals: 6 weeks   Independent with home exercise program . MET  Increase left elbow range of motion  deg. Great progress        Long Term Goals: 12 weeks   Increase left elbow to full range of motion  . Great progress  Report decreased left upper extremity pain < or =  3/10 with adls such as twisting his hand, opening his hand, opening a jar, cooking, dressing. Great progress  Increased manual muscle test for left upper extremity to 4/5 muscle grade to promote proper scapular stability to decrease left upper extremity pain < or =  3/10 with activities of daily living such as twisting his hand, opening his hand, opening a jar, cooking, dressing.  Great progress  Patient to achieve CL (at least 60% < 80% impaired, limited or restricted) level on the Focus On Therapeutic Outcomes System. Great progress    PLAN     Continue with established Physical therapy  plan of care and progress per patient tolerance and distal biceps tendon repair protocol.     Certification Period/Plan of care expiration: 7/25/2023 to 10/3/2023.     Outpatient Physical Therapy 2 times weekly for 12 weeks to include the following interventions: Manual Therapy, Moist Heat/ Ice, Neuromuscular Re-ed, Patient Education, Therapeutic Activities, and Therapeutic Exercise.     Braydon Velazquez, PT

## 2023-09-21 ENCOUNTER — CLINICAL SUPPORT (OUTPATIENT)
Dept: REHABILITATION | Facility: OTHER | Age: 43
End: 2023-09-21
Payer: COMMERCIAL

## 2023-09-21 DIAGNOSIS — M62.81 MUSCLE WEAKNESS OF LEFT UPPER EXTREMITY: ICD-10-CM

## 2023-09-21 DIAGNOSIS — M25.622 DECREASED RANGE OF MOTION OF LEFT ELBOW: Primary | ICD-10-CM

## 2023-09-21 PROCEDURE — 97112 NEUROMUSCULAR REEDUCATION: CPT | Mod: PN

## 2023-09-21 PROCEDURE — 97110 THERAPEUTIC EXERCISES: CPT | Mod: PN

## 2023-09-21 PROCEDURE — 97530 THERAPEUTIC ACTIVITIES: CPT | Mod: PN

## 2023-09-25 NOTE — PROGRESS NOTES
"[facility]   Physical Therapy Treatment Note     Name: José Miguel Youngblood  Clinic Number: [id number]    Therapy Diagnosis:   Encounter Diagnoses   Name Primary?    Decreased range of motion of left elbow Yes    Muscle weakness of left upper extremity        Physician: Tuan Mcguire PA-C    Visit Date: 9/26/2023    Physician Orders: Physical Therapy Evaluation and Treatreatent. Distal biceps tendon protocol  Medical Diagnosis: S46.212A (ICD-10-CM) - Rupture of distal biceps tendon, left, initial encounter  Evaluation Date: 7/25/2023  Authorization Period Expiration: 12/31/2023  Plan of Care Certification Period: 7/11/2023 - 10/03/2023  Visit # / Visits authorized: 13 (12/ 20)       Focus On therapeutic Outcomes (FOTO): 9/12/2023 (4/5; 2)    PTA Visit #: 1/5 9/19 - post op week 10     Precautions: Standard    Time In: 7:00 am  Time Out: 7:46 am  Total Billable Time: 46 minutes    SUBJECTIVE   Patient reports: Having some tightness along ulnar side of left forearm    He was compliant with home exercise program.  Response to previous treatment: "It was ok"  Functional change: increasing range of motion    Pain: 0/10   Location: left arm/elbow      OBJECTIVE     Objective Measures updated at progress report unless specified.     TREATMENT     José Miguel received the treatments listed below:      received therapeutic exercises to develop strength and ROM for 20 minutes including:  [x] Gentle left wrist flexor stretch 3 x 30"  [] Scapular retractions 20 x 5"  [] Shoulder shrugs 20 x 5"  [] Passive range of motion supination/pronation with elbow extended, elbow flexion/extension  [] Isometric tricep and bicep (gentle, 2 fingers) activation in flexion, midrange, and extension 10x 5 second holds each  [] Side lying shoulder external rotation  2 x 10 repetitions with 2#    [x] Bicep curl 2 x 10 with 2# weight  [x] Hammer curl with 2# weight 2 x 10  [x] Supination/pronation with 2# weight 3 x 10    [x] " Rows with green theraband x 30  [x] Shoulder extension x 30 repetitions with green theratube  [] Tricep extension with green theraband 2 x 10  [] Shoulder external rotation with green theratube 3 x 10 repetitions with shoulder adduction with thin green monster band    [] Bicep curl with red theraband 2 x 10   [] Bent over row with red theraband 2 x 10   [x] [place] (flexion/scaption/abduction) with 2# weight 2 x 10   [] Supine flexion Active Assisted Range of Motion with 1# dowel 2 x 10    Consider resisted scapular retraction and rotator cuff strengthening; posture exercises    received the following manual therapy techniques: Soft tissue Mobilization were applied to the: left foream/elbow for 00 minutes, including:  [] Gentle soft tissue mobilization to incision over lateral forearm      received the following dynamic functional therapeutic activities to improve functional performance for 10 minutes, including:  [x] Therabar (red) flexion/extension elbows flexed x 30  [x] Therabar (red) flexion/extension elbows extended x 30   [x] Yared twist with (red) therabar x 20 each     received the following neuromuscular re-education activities to improve: Coordination and Posture for 16 minutes. The following activities were included:  [x] Prone row 2 x 20 with 2#   [x] Prone horizontal abduction 2 x 20 with 2#   [x] Prone extension 2 x 20 with 2#   [x] Prone flexion 2 x 20 with 2#   [] Prone scapular retraction 2 x 20     [] Supine alphabet (upper/lower case) x1 each with 1# weight    [] Serratus punch with 2#  3 x 10   [] Ball at wall closed kinetic chain alphabet upper/lower x1 each   [] Ball at wall closed kinetic chain circles, forward and lateral, counter clockwise counterclockwise x 20 each  [] Serratus presses on wall 2 x 10 repetitions       PATIENT EDUCATION AND HOME EXERCISES     Home Exercises Provided and Patient Education Provided     Education provided:   - post operative status, healing process and  timeline  - use of brace as needed if he finds himself trying to lift heavy things with surgical arm.     Written Home Exercises Provided: Patient instructed to cont prior HEP. Exercises were reviewed and José Miguel was able to demonstrate them prior to the end of the session.  José Miguel demonstrated good  understanding of the education provided. See Electronic Medical Record under Patient Instructions for exercises provided during therapy sessions    ASSESSMENT   Continued with scapular and UE strengthening today with no reports of pain. Increased resistance on therabar.     José Miguel Is progressing well towards his goals.   Patient prognosis is Excellent.     Patient will continue to benefit from skilled outpatient physical therapy to address the deficits listed in the problem list box on initial evaluation, provide patient/family education and to maximize patient's level of independence in the home and community environment.     Patient's spiritual, cultural and educational needs considered and patient agreeable to plan of care and goals.     Anticipated barriers to physical therapy: none    Goals:   Short Term Goals: 6 weeks   Independent with home exercise program . MET  Increase left elbow range of motion  deg. Great progress        Long Term Goals: 12 weeks   Increase left elbow to full range of motion  . Great progress  Report decreased left upper extremity pain < or =  3/10 with adls such as twisting his hand, opening his hand, opening a jar, cooking, dressing. Great progress  Increased manual muscle test for left upper extremity to 4/5 muscle grade to promote proper scapular stability to decrease left upper extremity pain < or =  3/10 with activities of daily living such as twisting his hand, opening his hand, opening a jar, cooking, dressing.  Great progress  Patient to achieve CL (at least 60% < 80% impaired, limited or restricted) level on the Focus On Therapeutic Outcomes System. Great progress    PLAN    Certification Period/Plan of care expiration: 7/25/2023 to 10/3/2023.    Continue with established Physical therapy plan of care and progress per patient tolerance and distal biceps tendon repair protocol.        Outpatient Physical Therapy 2 times weekly for 12 weeks to include the following interventions: Manual Therapy, Moist Heat/ Ice, Neuromuscular Re-ed, Patient Education, Therapeutic Activities, and Therapeutic Exercise.     Teodoro Paez III, PTA

## 2023-09-26 ENCOUNTER — CLINICAL SUPPORT (OUTPATIENT)
Dept: REHABILITATION | Facility: OTHER | Age: 43
End: 2023-09-26
Payer: COMMERCIAL

## 2023-09-26 ENCOUNTER — DOCUMENTATION ONLY (OUTPATIENT)
Dept: REHABILITATION | Facility: OTHER | Age: 43
End: 2023-09-26

## 2023-09-26 DIAGNOSIS — M62.81 MUSCLE WEAKNESS OF LEFT UPPER EXTREMITY: ICD-10-CM

## 2023-09-26 DIAGNOSIS — M25.622 DECREASED RANGE OF MOTION OF LEFT ELBOW: Primary | ICD-10-CM

## 2023-09-26 PROCEDURE — 97112 NEUROMUSCULAR REEDUCATION: CPT | Mod: PN,CQ

## 2023-09-26 PROCEDURE — 97530 THERAPEUTIC ACTIVITIES: CPT | Mod: PN,CQ

## 2023-09-26 PROCEDURE — 97110 THERAPEUTIC EXERCISES: CPT | Mod: PN,CQ

## 2023-09-26 NOTE — PROGRESS NOTES
PT/PTA met face to face to discuss pt's treatment plan and progress towards established goals. Pt will be seen by a physical therapist minimally every 6th visit or every 30 days.    Teodoro Paez III, PTA        Meeting as noted above.     Braydon Velazquez, PT, CWS, Cert DN

## 2023-09-27 NOTE — PROGRESS NOTES
"[facility]   Physical Therapy Treatment Note     Name: José Miguel Youngblood  Clinic Number: [id number]    Therapy Diagnosis:   Encounter Diagnoses   Name Primary?    Decreased range of motion of left elbow Yes    Muscle weakness of left upper extremity        Physician: Tuan Mcguire PA-C    Visit Date: 9/28/2023    Physician Orders: Physical Therapy Evaluation and Treatreatent. Distal biceps tendon protocol  Medical Diagnosis: S46.212A (ICD-10-CM) - Rupture of distal biceps tendon, left, initial encounter  Evaluation Date: 7/25/2023  Authorization Period Expiration: 12/31/2023  Plan of Care Certification Period: 7/11/2023 - 10/03/2023  Visit # / Visits authorized: 13 (12/ 20)       Focus On therapeutic Outcomes (FOTO): 9/12/2023 (4/5; 2)    PTA Visit #: 0/5     9/26 - post op week 11     Precautions: Standard    Time In: 0704  Time Out: 0753   Total Billable Time: 49 minutes    SUBJECTIVE   Patient denies pain this morning.    He was compliant with home exercise program.  Response to previous treatment: "It was good."  Functional change: easier to lift things    Pain: 0/10   Location: left arm/elbow      OBJECTIVE     Objective Measures updated at progress report unless specified.     TREATMENT     José Miguel received the treatments listed below:      received therapeutic exercises to develop strength and ROM for 20 minutes including:  [x] Gentle left wrist flexor stretch 3 x 30"  [] Scapular retractions 20 x 5"  [] Shoulder shrugs 20 x 5"  [] Passive range of motion supination/pronation with elbow extended, elbow flexion/extension  [] Isometric tricep and bicep (gentle, 2 fingers) activation in flexion, midrange, and extension 10x 5 second holds each  [] Side lying shoulder external rotation  2 x 10 repetitions with 2#    [x] Bicep curl 2 x 10 with 2# weight  [x] Hammer curl with 2# weight 2 x 10  [x] Supination/pronation with 2# weight 3 x 10    [x] Rows with green theraband x 30  [x] Shoulder " extension x 30 repetitions with green theratube  [] Tricep extension with green theraband 2 x 10  [] Shoulder external rotation with green theratube 3 x 10 repetitions with shoulder adduction with thin green monster band    [] Bicep curl with red theraband 2 x 10   [] Bent over row with red theraband 2 x 10   [x] [place] (flexion/scaption/abduction) with 2# weight 2 x 10   [] Supine flexion Active Assisted Range of Motion with 1# dowel 2 x 10    Consider resisted scapular retraction and rotator cuff strengthening; posture exercises    received the following manual therapy techniques: Soft tissue Mobilization were applied to the: left foream/elbow for 00 minutes, including:  [] Gentle soft tissue mobilization to incision over lateral forearm      received the following dynamic functional therapeutic activities to improve functional performance for 10 minutes, including:  [x] Therabar (red) flexion/extension elbows flexed x 30  [x] Therabar (red) flexion/extension elbows extended x 30   [x] Yared twist with (red) therabar x 20 each     received the following neuromuscular re-education activities to improve: Coordination and Posture for 19 minutes. The following activities were included:  [x] Prone row 2 x 20 with 2#   [x] Prone horizontal abduction 2 x 20 with 2#   [x] Prone extension 2 x 20 with 2#   [x] Prone flexion 2 x 20 with 2#   [] Prone scapular retraction 2 x 20     [x] Supine alphabet (upper/lower case) x1 each with 1# weight    [x] Serratus punch with 2#  3 x 10   [x] Ball at wall closed kinetic chain alphabet upper/lower x1 each   [x] Ball at wall closed kinetic chain circles, forward and lateral, counter clockwise counterclockwise x 20 each  [x] Serratus presses on wall 2 x 10 repetitions       PATIENT EDUCATION AND HOME EXERCISES     Home Exercises Provided and Patient Education Provided     Education provided:   - post operative status, healing process and timeline  - use of brace as needed if he finds  himself trying to lift heavy things with surgical arm.     Written Home Exercises Provided: Patient instructed to cont prior HEP. Exercises were reviewed and José Miguel was able to demonstrate them prior to the end of the session.  José Miguel demonstrated good  understanding of the education provided. See Electronic Medical Record under Patient Instructions for exercises provided during therapy sessions    ASSESSMENT   Continuing to progress exercises per protocol. No adverse effects or increased pain with exercises today.    José Miguel Is progressing well towards his goals.   Patient prognosis is Excellent.     Patient will continue to benefit from skilled outpatient physical therapy to address the deficits listed in the problem list box on initial evaluation, provide patient/family education and to maximize patient's level of independence in the home and community environment.     Patient's spiritual, cultural and educational needs considered and patient agreeable to plan of care and goals.     Anticipated barriers to physical therapy: none    Goals:   Short Term Goals: 6 weeks   Independent with home exercise program . MET  Increase left elbow range of motion  deg. Great progress        Long Term Goals: 12 weeks   Increase left elbow to full range of motion  . Great progress  Report decreased left upper extremity pain < or =  3/10 with adls such as twisting his hand, opening his hand, opening a jar, cooking, dressing. Great progress  Increased manual muscle test for left upper extremity to 4/5 muscle grade to promote proper scapular stability to decrease left upper extremity pain < or =  3/10 with activities of daily living such as twisting his hand, opening his hand, opening a jar, cooking, dressing.  Great progress  Patient to achieve CL (at least 60% < 80% impaired, limited or restricted) level on the Focus On Therapeutic Outcomes System. Great progress    PLAN   Certification Period/Plan of care expiration: 7/25/2023 to  10/3/2023.    Continue with established Physical therapy plan of care and progress per patient tolerance and distal biceps tendon repair protocol.        Outpatient Physical Therapy 2 times weekly for 12 weeks to include the following interventions: Manual Therapy, Moist Heat/ Ice, Neuromuscular Re-ed, Patient Education, Therapeutic Activities, and Therapeutic Exercise.     Braydon Velazquez, PT

## 2023-09-28 ENCOUNTER — CLINICAL SUPPORT (OUTPATIENT)
Dept: REHABILITATION | Facility: OTHER | Age: 43
End: 2023-09-28
Payer: COMMERCIAL

## 2023-09-28 DIAGNOSIS — M62.81 MUSCLE WEAKNESS OF LEFT UPPER EXTREMITY: ICD-10-CM

## 2023-09-28 DIAGNOSIS — M25.622 DECREASED RANGE OF MOTION OF LEFT ELBOW: Primary | ICD-10-CM

## 2023-09-28 PROCEDURE — 97110 THERAPEUTIC EXERCISES: CPT | Mod: PN

## 2023-09-28 PROCEDURE — 97112 NEUROMUSCULAR REEDUCATION: CPT | Mod: PN

## 2023-09-28 PROCEDURE — 97530 THERAPEUTIC ACTIVITIES: CPT | Mod: PN

## 2023-09-29 NOTE — PROGRESS NOTES
"[facility]   Physical Therapy Treatment Note     Name: José Miguel Youngblood  Clinic Number: [id number]    Therapy Diagnosis:   Encounter Diagnoses   Name Primary?    Decreased range of motion of left elbow Yes    Muscle weakness of left upper extremity          Physician: Tuan Mcguire PA-C    Visit Date: 10/2/2023    Physician Orders: Physical Therapy Evaluation and Treatreatent. Distal biceps tendon protocol  Medical Diagnosis: S46.212A (ICD-10-CM) - Rupture of distal biceps tendon, left, initial encounter  Evaluation Date: 7/25/2023  Authorization Period Expiration: 12/31/2023  Plan of Care Certification Period: 7/11/2023 - 10/03/2023  Visit # / Visits authorized: 14 (13/ 20)      Re-assessment: due next visit  Focus On therapeutic Outcomes (FOTO): 9/12/2023 (5/5; 2)    PTA Visit #: 0/5 9/26 - post op week 11     Precautions: Standard    Time In: 7:02 am  Time Out: 8:00 am  Total Billable Time: 55 minutes    SUBJECTIVE   Patient he is feeling better each time.  He continues to limit himself with weight in the gym.  Light weights feel easy for bicep exercises.      He was compliant with home exercise program.  Response to previous treatment: "It was good."  Functional change: easier to lift things    Pain: 0/10   Location: left arm/elbow      OBJECTIVE     Objective Measures updated at progress report unless specified.     TREATMENT     José Miguel received the treatments listed below:      received therapeutic exercises to develop strength and ROM for 25 minutes including:  [x] Gentle left wrist flexor stretch 3 x 30"  [] Scapular retractions 20 x 5"  [] Shoulder shrugs 20 x 5"  [] Passive range of motion supination/pronation with elbow extended, elbow flexion/extension  [] Isometric tricep and bicep (gentle, 2 fingers) activation in flexion, midrange, and extension 10x 5 second holds each  [x] Side lying shoulder external rotation 3x 10 repetitions with 2#  [x] + Side lying shoulder flexion " with 2# weight 3x 10  [x] + Side lying shoulder horizontal abduction with 2# weight 3x 10  [x] + Side lying shoulder abduction with 2# weight 3x 10     [x] Bicep curl 2x 10 with 3# weight  [x] Hammer curl with 2# weight 2x 10  [x] Supination/pronation with 3# weight 3 x 10    [] Rows with green theraband x 30  [] Shoulder extension x 30 repetitions with green theratube  [x] Tricep extension with green theraband 2x 10  [] Shoulder external rotation with green theratube 3 x 10 repetitions with shoulder adduction with thin green monster band    [] Bicep curl with red theraband 2 x 10   [] Bent over row with red theraband 2 x 10   [] [place] (flexion/scaption/abduction) with 2# weight 2 x 10   [] Supine flexion Active Assisted Range of Motion with 1# dowel 2 x 10    Consider resisted scapular retraction and rotator cuff strengthening; posture exercises    received the following manual therapy techniques: Soft tissue Mobilization were applied to the: left foream/elbow for 00 minutes, including:  [] Gentle soft tissue mobilization to incision over lateral forearm      received the following dynamic functional therapeutic activities to improve functional performance for 15 minutes, including:  [x] Therabar (red) flexion/extension elbows flexed x 30  [x] Therabar (red) flexion/extension elbows extended x 30   [x] Yared twist with (red) therabar x 20 each   [x] + Velcro board supination/pronation with composite  and pincher  x3 minutes each    received the following neuromuscular re-education activities to improve: Coordination and Posture for 15 minutes. The following activities were included:  [x] Prone row 2 x 20 with 2#   [x] Prone horizontal abduction 2 x 20 with 2#   [x] Prone extension 2 x 20 with 2#   [] Prone flexion 2 x 20 with 2#   [] Prone scapular retraction 2 x 20     [x] Supine alphabet (upper/lower case) x1 each with 3# weight    [x] Serratus punch with 3#  3 x 10   [] Ball at wall closed kinetic chain  alphabet upper/lower x1 each   [x] Ball at wall closed kinetic chain circles, forward and lateral, counter clockwise counterclockwise x 20 each  [x] Serratus presses on wall 2 x 10 repetitions       PATIENT EDUCATION AND HOME EXERCISES     Home Exercises Provided and Patient Education Provided     Education provided:   - post operative status, healing process and timeline  - use of brace as needed if he finds himself trying to lift heavy things with surgical arm.     Written Home Exercises Provided: Patient instructed to cont prior HEP. Exercises were reviewed and José Miguel was able to demonstrate them prior to the end of the session.  José Miguel demonstrated good  understanding of the education provided. See Electronic Medical Record under Patient Instructions for exercises provided during therapy sessions    ASSESSMENT   Continued with gentle bicep strengthening.  Increased weights to 3# without pain. Initiated sidelying shoulder strengthening and supination/pronation exercises for improved functional activities.  Plan for re-assessment and updated plan of care at next visit.     José Miguel Is progressing well towards his goals.   Patient prognosis is Excellent.     Patient will continue to benefit from skilled outpatient physical therapy to address the deficits listed in the problem list box on initial evaluation, provide patient/family education and to maximize patient's level of independence in the home and community environment.     Patient's spiritual, cultural and educational needs considered and patient agreeable to plan of care and goals.     Anticipated barriers to physical therapy: none    Goals:   Short Term Goals: 6 weeks   Independent with home exercise program . MET  Increase left elbow range of motion  deg. Great progress        Long Term Goals: 12 weeks   Increase left elbow to full range of motion  . Great progress  Report decreased left upper extremity pain < or =  3/10 with adls such as twisting his hand,  opening his hand, opening a jar, cooking, dressing. Great progress  Increased manual muscle test for left upper extremity to 4/5 muscle grade to promote proper scapular stability to decrease left upper extremity pain < or =  3/10 with activities of daily living such as twisting his hand, opening his hand, opening a jar, cooking, dressing.  Great progress  Patient to achieve CL (at least 60% < 80% impaired, limited or restricted) level on the Focus On Therapeutic Outcomes System. Great progress    PLAN   Certification Period/Plan of care expiration: 7/25/2023 to 10/3/2023.    Continue with established Physical therapy plan of care and progress per patient tolerance and distal biceps tendon repair protocol.        Outpatient Physical Therapy 2 times weekly for 12 weeks to include the following interventions: Manual Therapy, Moist Heat/ Ice, Neuromuscular Re-ed, Patient Education, Therapeutic Activities, and Therapeutic Exercise.     Nancy Borjas, PT

## 2023-10-02 ENCOUNTER — CLINICAL SUPPORT (OUTPATIENT)
Dept: REHABILITATION | Facility: OTHER | Age: 43
End: 2023-10-02
Payer: COMMERCIAL

## 2023-10-02 DIAGNOSIS — M62.81 MUSCLE WEAKNESS OF LEFT UPPER EXTREMITY: ICD-10-CM

## 2023-10-02 DIAGNOSIS — M25.622 DECREASED RANGE OF MOTION OF LEFT ELBOW: Primary | ICD-10-CM

## 2023-10-02 PROCEDURE — 97530 THERAPEUTIC ACTIVITIES: CPT | Mod: PN

## 2023-10-02 PROCEDURE — 97110 THERAPEUTIC EXERCISES: CPT | Mod: PN

## 2023-10-02 PROCEDURE — 97112 NEUROMUSCULAR REEDUCATION: CPT | Mod: PN

## 2023-10-03 NOTE — PROGRESS NOTES
"[facility]   Physical Therapy Treatment Note     Name: José Miguel Hilton  Clinic Number: [id number]    Therapy Diagnosis:   Encounter Diagnoses   Name Primary?    Decreased range of motion of left elbow Yes    Muscle weakness of left upper extremity      Physician: Tuan Mcguire PA-C    Visit Date: 10/4/2023    Physician Orders: Physical Therapy Evaluation and Treatreatent. Distal biceps tendon protocol  Medical Diagnosis: S46.212A (ICD-10-CM) - Rupture of distal biceps tendon, left, initial encounter  Evaluation Date: 7/25/2023  Authorization Period Expiration: 12/31/2023  Plan of Care Certification Period: 7/11/2023 - 10/03/2023 --> 10/4/2023 - 12/1/2023   Visit # / Visits authorized: 15 (14/ 20)      Re-assessment: due 11/4/2023  Focus On therapeutic Outcomes (FOTO): 10/4/2023 (1/5; 3)    PTA Visit #: 0/5     10/3/2023 - post op week 12     Precautions: Standard    Time In: 7:04 am   Time Out: 7:45 am   Total Billable Time: 40 minutes    SUBJECTIVE   Patient reports: See updated plan of care     He was compliant with home exercise program.  Response to previous treatment: no adverse effects  Functional change: easier to lift things    Pain: 0/10   Location: left arm/elbow      OBJECTIVE     Objective Measures updated at progress report unless specified.     TREATMENT     José Miguel received the treatments listed below:      received therapeutic exercises to develop strength and ROM for 10 minutes including:  [x] Upper body ergonometer level 1 alternating forward/back each minute x6 minutes  [x] Green foam ball squeeze 30x 5 second holds   [] Gentle left wrist flexor stretch 3 x 30"  [] Scapular retractions 20 x 5"  [] Shoulder shrugs 20 x 5"  [] Passive range of motion supination/pronation with elbow extended, elbow flexion/extension  [] Isometric tricep and bicep (gentle, 2 fingers) activation in flexion, midrange, and extension 10x 5 second holds each  [] Side lying shoulder external rotation " 3x 10 repetitions with 2#  [] + Side lying shoulder flexion with 2# weight 3x 10  [] + Side lying shoulder horizontal abduction with 2# weight 3x 10  [] + Side lying shoulder abduction with 2# weight 3x 10     [] Bicep curl 2x 10 with 3# weight  [] Hammer curl with 2# weight 2x 10  [] Supination/pronation with 3# weight 3 x 10    [] Rows with green theraband x 30  [] Shoulder extension x 30 repetitions with green theratube  [] Tricep extension with green theraband 2x 10  [] Shoulder external rotation with green theratube 3 x 10 repetitions with shoulder adduction with thin green monster band    [] Bicep curl with red theraband 2 x 10   [] Bent over row with red theraband 2 x 10   [] [place]YENNE (flexion/scaption/abduction) with 2# weight 2 x 10   [] Supine flexion Active Assisted Range of Motion with 1# dowel 2 x 10    Consider resisted scapular retraction and rotator cuff strengthening; posture exercises    received the following manual therapy techniques: Soft tissue Mobilization were applied to the: left foream/elbow for 00 minutes, including:  [] Gentle soft tissue mobilization to incision over lateral forearm      received the following dynamic functional therapeutic activities to improve functional performance for 30 minutes, including:  [x] Re-assessment/updated plan of care/Focus On therapeutic Outcomes (FOTO)   [] Therabar (red) flexion/extension elbows flexed x 30  [] Therabar (red) flexion/extension elbows extended x 30   [] Yared twist with (red) therabar x 20 each   [] + Velcro board supination/pronation with composite  and pincher  x3 minutes each    received the following neuromuscular re-education activities to improve: Coordination and Posture for 00 minutes. The following activities were included:  [] Prone row 2 x 20 with 2#   [] Prone horizontal abduction 2 x 20 with 2#   [] Prone extension 2 x 20 with 2#   [] Prone flexion 2 x 20 with 2#   [] Prone scapular retraction 2 x 20     [] Supine  alphabet (upper/lower case) x1 each with 3# weight    [] Serratus punch with 3#  3 x 10   [] Ball at wall closed kinetic chain alphabet upper/lower x1 each   [] Ball at wall closed kinetic chain circles, forward and lateral, counter clockwise counterclockwise x 20 each    [] Serratus presses on wall 2 x 10 repetitions       PATIENT EDUCATION AND HOME EXERCISES     Home Exercises Provided and Patient Education Provided     Education provided:   - post operative status, healing process and timeline  - use of brace as needed if he finds himself trying to lift heavy things with surgical arm.     Written Home Exercises Provided: Patient instructed to cont prior HEP. Exercises were reviewed and José Miguel was able to demonstrate them prior to the end of the session.  José Miguel demonstrated good  understanding of the education provided. See Electronic Medical Record under Patient Instructions for exercises provided during therapy sessions    ASSESSMENT   See updated plan of care     José Miguel Is progressing well towards his goals.   Patient prognosis is Excellent.     Patient will continue to benefit from skilled outpatient physical therapy to address the deficits listed in the problem list box on initial evaluation, provide patient/family education and to maximize patient's level of independence in the home and community environment.     Patient's spiritual, cultural and educational needs considered and patient agreeable to plan of care and goals.     Anticipated barriers to physical therapy: none    Goals:   Short Term Goals: 6 weeks   Independent with home exercise program . MET  Increase left elbow range of motion  deg. MET        Long Term Goals: 12 weeks   Increase left elbow to full range of motion  . Great progress  Report decreased left upper extremity pain < or =  3/10 with adls such as twisting his hand, opening his hand, opening a jar, cooking, dressing. MET  Increased manual muscle test for left upper extremity to 4/5  muscle grade to promote proper scapular stability to decrease left upper extremity pain < or =  3/10 with activities of daily living such as twisting his hand, opening his hand, opening a jar, cooking, dressing.  Great progress  Patient to achieve CL (at least 60% < 80% impaired, limited or restricted) level on the Focus On Therapeutic Outcomes System. Great progress   Patient will improve  strength on left to 65# for improved chopping, cooking, and exercising. (NEW 10/4/2023)    PLAN     Continue with established Physical therapy plan of care and progress per patient tolerance and distal biceps tendon repair protocol.     Updated Certification Period: 10/4/2023 to 12/1/2023  Recommended Treatment Plan: 1-2 times per week for 8 weeks: Manual Therapy, Moist Heat/ Ice, Neuromuscular Re-ed, Patient Education, Therapeutic Activities, and Therapeutic Exercise    Nancy Borjas, PT

## 2023-10-04 ENCOUNTER — CLINICAL SUPPORT (OUTPATIENT)
Dept: REHABILITATION | Facility: OTHER | Age: 43
End: 2023-10-04
Payer: COMMERCIAL

## 2023-10-04 DIAGNOSIS — M62.81 MUSCLE WEAKNESS OF LEFT UPPER EXTREMITY: ICD-10-CM

## 2023-10-04 DIAGNOSIS — M25.622 DECREASED RANGE OF MOTION OF LEFT ELBOW: Primary | ICD-10-CM

## 2023-10-04 PROCEDURE — 97530 THERAPEUTIC ACTIVITIES: CPT | Mod: PN

## 2023-10-04 PROCEDURE — 97110 THERAPEUTIC EXERCISES: CPT | Mod: PN

## 2023-10-04 NOTE — PLAN OF CARE
Outpatient Therapy Updated Plan of Care     Visit Date: 10/4/2023  Name: José Miguel Hilton  [facility] Number: [id number]    Therapy Diagnosis:   Encounter Diagnoses   Name Primary?    Decreased range of motion of left elbow Yes    Muscle weakness of left upper extremity      Physician: Tuan Mcguire PA-C    Physician Orders: Physical Therapy Evaluation and Treatreatent. Distal biceps tendon protocol  Medical Diagnosis: S46.212A (ICD-10-CM) - Rupture of distal biceps tendon, left, initial encounter  Evaluation Date: 7/25/2023  Authorization Period Expiration: 12/31/2023  Prior Certification Period: 7/25/2023 to 10/3/2023  Current Certification Period:  10/4/2023 to 12/1/2023  Visit # / Visits authorized: 15/24 (+ eval)  Re-assessment: due 11/4/2023  FOTO: 10/4/2023 (1/5; 3)     Cancelled Visits: 1  No Show Visits: 0    Precautions:  Standard    Functional Level Prior to Evaluation:  independent with ambulation and Activities of daily living    Subjective     Update: Patient reports: therapy has been going well.  He is finding it easier to move and do things now that he is out of the brace.  He endorses some pins and needles in the hand yesterday that resolved quickly and is attributed to positioning and pressure on the elbow.  He isn't able to lift the weights resistance wise that he would like.       He was compliant with home exercise program.  Response to previous treatment: no adverse effects  Functional change: easier to lift things and move the elbow     Pain: 0/10   Location: left arm/elbow      Objective     Update:    strength (position 3)  Right: 65, 70, 70  Left: 35, 40, 45 lbs    Left elbow: 0-138  Supination: 50  Pronation: 60      Right elbow: 0-130  Supination: 50 degrees  Pronation: 80 degrees    Manual Muscle test:  Left elbow flexion: 4/5  Left elbow extension: 5/5  Left supination: 4/5  Left pronation: 4/5    Right elbow flexion: 5/5  Left elbow extension: 5/5  Left elbow supination: 5/5  Left elbow  pronation: 5/5    DISABILITIES OF THE ARM SHOULDER AND HAND: 69.2 --> 37.5 --> 16.7 (higher score = greater disability)     Intake Outcome Measure for FOTO Elbow Survey     Therapist reviewed FOTO scores for José Miguel Hilton on 8/17/2023.   FOTO documents entered into [employer] - see Media section.     Intake score: 35% --> 52% --> 64%            Assessment     Update: Patient has made excellent progress towards goals with improving range of motion and strength.  Limitations with  strength on left evident with formal assessment today.  Initiated Upper body ergonometer and progressed with  exercises to improve functional activities at home as prolonged periods of gripping or holding objects (chopping/cooking) cause fatigue and cramping in the hand due to weakness.  Discussed progressing as tolerated with resistance for biceps as well to avoid re-injury.  Plan to progress resistance and incorporate additional resisted shoulder exercises at future visit as tolerated.      Short Term Goals: 6 weeks   Independent with home exercise program . MET  Increase left elbow range of motion  deg. MET        Long Term Goals: 12 weeks   Increase left elbow to full range of motion  . Great progress  Report decreased left upper extremity pain < or =  3/10 with adls such as twisting his hand, opening his hand, opening a jar, cooking, dressing. MET  Increased manual muscle test for left upper extremity to 4/5 muscle grade to promote proper scapular stability to decrease left upper extremity pain < or =  3/10 with activities of daily living such as twisting his hand, opening his hand, opening a jar, cooking, dressing.  Great progress  Patient to achieve CL (at least 60% < 80% impaired, limited or restricted) level on the Focus On Therapeutic Outcomes System. Great progress   Patient will improve  strength on left to 65# for improved chopping, cooking, and exercising. (NEW 10/4/2023)    Previous Short Term Goals Status:    1 met, 1 in progress  New Short Term Goals Status:   both MET  Long Term Goal Status:   Updated:  goal #7 added  Reasons for Recertification of Therapy:   Patient will continue to benefit from additional skilled physical therapy to safely progress strengthening in order to avoid re-injury.      Plan     Updated Certification Period: 10/4/2023 to 12/1/2023  Recommended Treatment Plan: 1-2 times per week for 8 weeks: Manual Therapy, Moist Heat/ Ice, Neuromuscular Re-ed, Patient Education, Therapeutic Activities, and Therapeutic Exercise    Nancy Borjas, PT  10/4/2023

## 2023-10-06 NOTE — PROGRESS NOTES
"[facility]   Physical Therapy Treatment Note     Name: José Miguel Hitlon  Clinic Number: [id number]    Therapy Diagnosis:   Encounter Diagnoses   Name Primary?    Decreased range of motion of left elbow Yes    Muscle weakness of left upper extremity        Physician: Tuan Mcguire PA-C    Visit Date: 10/9/2023    Physician Orders: Physical Therapy Evaluation and Treatreatent. Distal biceps tendon protocol  Medical Diagnosis: S46.212A (ICD-10-CM) - Rupture of distal biceps tendon, left, initial encounter  Evaluation Date: 7/25/2023  Authorization Period Expiration: 12/31/2023  Plan of Care Certification Period: 10/4/2023 - 12/1/2023   Visit # / Visits authorized: 16 (15/ 20)      Re-assessment: due 11/4/2023  Focus On therapeutic Outcomes (FOTO): 10/4/2023 (1/5; 3)    PTA Visit #: 0/5     10/3/2023 - post op week 12     Precautions: Standard    Time In: 7:49 am    Time Out: 8:36 am   Total Billable Time: 45 minutes    SUBJECTIVE   Patient reports: he is feeling good.  No real changes to report from last time.  He has a follow up next week with the surgeon.     He was compliant with home exercise program.  Response to previous treatment: no adverse effects  Functional change: easier to lift things    Pain: 0/10   Location: left arm/elbow      OBJECTIVE     Objective Measures updated at progress report unless specified.     TREATMENT     José Miguel received the treatments listed below:      received therapeutic exercises to develop strength and ROM for 20 minutes including:  [x] Upper body ergonometer level 1 alternating forward/back each minute x6 minutes  [x] Green foam ball squeeze 30x 5 second holds   [x] Green band finger extension 30x   [] Gentle left wrist flexor stretch 3 x 30"  [] Scapular retractions 20 x 5"  [] Shoulder shrugs 20 x 5"  [] Passive range of motion supination/pronation with elbow extended, elbow flexion/extension  [] Isometric tricep and bicep (gentle, 2 fingers) activation " in flexion, midrange, and extension 10x 5 second holds each  [x] Side lying shoulder external rotation 3x 10 repetitions with 2#  [x] Side lying shoulder flexion with 2# weight 3x 10  [x] Side lying shoulder horizontal abduction with 2# weight 3x 10  [x] Side lying shoulder abduction with 2# weight 3x 10     [x] Bicep curl 2x 10 with 3# weight  [x] Hammer curl with 3# weight 2x 10  [] Supination/pronation with 3# weight 3 x 10    [] Rows with green theraband x 30  [] Shoulder extension x 30 repetitions with green theratube  [x] Tricep extension with 3# weight 3x 10  [] Shoulder external rotation with green theratube 3 x 10 repetitions with shoulder adduction with thin green monster band    [] Bent over row with red theraband 2 x 10   [x] HCEYENNE (flexion/scaption/abduction) with 2# weight 2 x 10   [] Supine flexion Active Assisted Range of Motion with 1# dowel 2 x 10    received the following manual therapy techniques: Soft tissue Mobilization were applied to the: left foream/elbow for 00 minutes, including:  [] Gentle soft tissue mobilization to incision over lateral forearm      received the following dynamic functional therapeutic activities to improve functional performance for 15 minutes, including:  [] Re-assessment/updated plan of care/Focus On therapeutic Outcomes (FOTO)   [] Therabar (red) flexion/extension elbows flexed x 30  [] Therabar (red) flexion/extension elbows extended x 30   [] Yared twist with (red) therabar x 20 each   [x] + Velcro board supination/pronation with composite  and pincher  x3 minutes each  [x] + overhead carry with 2# weight 2x 40 feet  [x] + Zombie carries with 2# weight flexion/scaption 2x 40 feet each    received the following neuromuscular re-education activities to improve: Coordination and Posture for 10 minutes. The following activities were included:  [] Prone row 2 x 20 with 2#   [] Prone horizontal abduction 2 x 20 with 2#   [] Prone extension 2 x 20 with 2#   []  Prone flexion 2 x 20 with 2#   [] Prone scapular retraction 2 x 20     [] Supine alphabet (upper/lower case) x1 each with 3# weight    [] Serratus punch with 3#  3 x 10   [] Ball at wall (green weighted ball) closed kinetic chain alphabet upper/lower x1 each   [x] Ball at wall (green weighted ball) closed kinetic chain circles, forward and lateral, counter clockwise counterclockwise x 30 each    [x] Serratus presses on wall 3x 10 repetitions   [x] + wall push up x10      PATIENT EDUCATION AND HOME EXERCISES     Home Exercises Provided and Patient Education Provided     Education provided:   - exercise form and purpose    Written Home Exercises Provided: Patient instructed to cont prior HEP. Exercises were reviewed and José Miguel was able to demonstrate them prior to the end of the session.  José Miguel demonstrated good  understanding of the education provided. See Electronic Medical Record under Patient Instructions for exercises provided during therapy sessions    ASSESSMENT   Initiated wall push ups and carries with fatigue and soreness noted in shoulder/rotator cuff area. Continue with , bicep and shoulder strengthening as tolerated.     José Miguel Is progressing well towards his goals.   Patient prognosis is Excellent.     Patient will continue to benefit from skilled outpatient physical therapy to address the deficits listed in the problem list box on initial evaluation, provide patient/family education and to maximize patient's level of independence in the home and community environment.     Patient's spiritual, cultural and educational needs considered and patient agreeable to plan of care and goals.     Anticipated barriers to physical therapy: none    Goals:   Short Term Goals: 6 weeks   Independent with home exercise program . MET  Increase left elbow range of motion  deg. MET        Long Term Goals: 12 weeks   Increase left elbow to full range of motion  . Great progress  Report decreased left upper extremity  pain < or =  3/10 with adls such as twisting his hand, opening his hand, opening a jar, cooking, dressing. MET  Increased manual muscle test for left upper extremity to 4/5 muscle grade to promote proper scapular stability to decrease left upper extremity pain < or =  3/10 with activities of daily living such as twisting his hand, opening his hand, opening a jar, cooking, dressing.  Great progress  Patient to achieve CL (at least 60% < 80% impaired, limited or restricted) level on the Focus On Therapeutic Outcomes System. Great progress   Patient will improve  strength on left to 65# for improved chopping, cooking, and exercising. (NEW 10/4/2023)    PLAN     Continue with established Physical therapy plan of care and progress per patient tolerance and distal biceps tendon repair protocol.     Updated Certification Period: 10/4/2023 to 12/1/2023  Recommended Treatment Plan: 1-2 times per week for 8 weeks: Manual Therapy, Moist Heat/ Ice, Neuromuscular Re-ed, Patient Education, Therapeutic Activities, and Therapeutic Exercise    Nancy Borjas, PT

## 2023-10-09 ENCOUNTER — CLINICAL SUPPORT (OUTPATIENT)
Dept: REHABILITATION | Facility: OTHER | Age: 43
End: 2023-10-09
Payer: COMMERCIAL

## 2023-10-09 DIAGNOSIS — M62.81 MUSCLE WEAKNESS OF LEFT UPPER EXTREMITY: ICD-10-CM

## 2023-10-09 DIAGNOSIS — M25.622 DECREASED RANGE OF MOTION OF LEFT ELBOW: Primary | ICD-10-CM

## 2023-10-09 PROCEDURE — 97112 NEUROMUSCULAR REEDUCATION: CPT | Mod: PN

## 2023-10-09 PROCEDURE — 97110 THERAPEUTIC EXERCISES: CPT | Mod: PN

## 2023-10-09 PROCEDURE — 97530 THERAPEUTIC ACTIVITIES: CPT | Mod: PN

## 2023-10-11 NOTE — PROGRESS NOTES
"[facility]   Physical Therapy Treatment Note     Name: José Miguel Hilton  [facility] Number: [id number]    Therapy Diagnosis:   Encounter Diagnoses   Name Primary?    Decreased range of motion of left elbow Yes    Muscle weakness of left upper extremity        Physician: Tuan Mcguire PA-C    Visit Date: 10/12/2023    Physician Orders: Physical Therapy Evaluation and Treatreatent. Distal biceps tendon protocol  Medical Diagnosis: S46.212A (ICD-10-CM) - Rupture of distal biceps tendon, left, initial encounter  Evaluation Date: 7/25/2023  Authorization Period Expiration: 12/31/2023  Plan of Care Certification Period: 10/4/2023 - 12/1/2023   Visit # / Visits authorized: 17 (16/ 20)      Re-assessment: due 11/4/2023  Focus On therapeutic Outcomes (FOTO): 10/4/2023 (2/5; 3)    PTA Visit #: 1/5     10/3/2023 - post op week 12     Precautions: Standard    Time In: 4:47 pm    Time Out: 5:30 pm   Total Billable Time: 43 minutes    SUBJECTIVE   Patient reports: No changes to report    He was compliant with home exercise program.  Response to previous treatment: "It was good"  Functional change: easier to lift things    Pain: 0/10   Location: left arm/elbow      OBJECTIVE     Objective Measures updated at progress report unless specified.     TREATMENT     José Miguel received the treatments listed below:      received therapeutic exercises to develop strength and ROM for 20 minutes including:  [x] Upper body ergonometer (level 1) alternating forward/back each minute x 6 minutes  [x] Green foam ball squeeze 30x 5 second holds   [x] Green band finger extension 30x   [] Gentle left wrist flexor stretch 3 x 30"  [] Scapular retractions 20 x 5"  [] Shoulder shrugs 20 x 5"  [] Passive range of motion supination/pronation with elbow extended, elbow flexion/extension  [] Isometric tricep and bicep (gentle, 2 fingers) activation in flexion, midrange, and extension 10x 5 second holds each  [x] Side lying shoulder external " rotation 3x 10 repetitions with 2#   [x] Side lying shoulder flexion with 2# weight 3x 10    [x] Side lying shoulder horizontal abduction with 2# weight 3x 10   [x] Side lying shoulder abduction with 2# weight 3x 10     [x] Bicep curl 2x 10 with 3# weight   [x] Hammer curl with 3# weight 2x 10   [] Supination/pronation with 3# weight 3 x 10     [] Rows with green theraband x 30     [] Shoulder extension x 30 repetitions with Green Theratube   [x] Tricep extension with 3# weight 3x 10   [] Shoulder external rotation with Green Theratube 3 x 10 repetitions with shoulder adduction with thin green monster band    [] Bent over row with Red Theraband 2 x 10   [x] [place] (flexion/scaption/abduction) with 2# weight 2 x 10   [] Supine flexion Active Assisted Range of Motion with 1# dowel 2 x 10    received the following manual therapy techniques: Soft tissue Mobilization were applied to the: left foream/elbow for 00 minutes, including:  [] Gentle soft tissue mobilization to incision over lateral forearm      received the following dynamic functional therapeutic activities to improve functional performance for 15 minutes, including:  [] Re-assessment/updated plan of care/Focus On therapeutic Outcomes (FOTO)   [] Therabar (red) flexion/extension elbows flexed x 30   [] Therabar (red) flexion/extension elbows extended x 30   [] Yared twist with (red) therabar x 20 each   [x] Velcro board supination/pronation with composite  and pincher  x3 minutes each   [x] Overhead carry with 2# weight 2x 40 feet   [x] Zombie carries with 2# weight flexion/scaption 2x 40 feet each     received the following neuromuscular re-education activities to improve: Coordination and Posture for 8 minutes. The following activities were included:  [] Prone row 2 x 20 with 2#   [] Prone horizontal abduction 2 x 20 with 2#   [] Prone extension 2 x 20 with 2#   [] Prone flexion 2 x 20 with 2#   [] Prone scapular retraction 2 x 20     [] Supine  alphabet (upper/lower case) x1 each with 3# weight    [] Serratus punch with 3#  3 x 10   [] Ball at wall (green weighted ball) closed kinetic chain alphabet upper/lower x1 each   [] Ball at wall (green weighted ball) closed kinetic chain circles, forward and lateral, counter clockwise counterclockwise x 30 each    [x] Serratus presses on wall 3 x 10 repetitions   [x] Wall push up x 10      PATIENT EDUCATION AND HOME EXERCISES     Home Exercises Provided and Patient Education Provided     Education provided:   - exercise form and purpose    Written Home Exercises Provided: Patient instructed to cont prior HEP. Exercises were reviewed and José Miguel was able to demonstrate them prior to the end of the session.  José Miguel demonstrated good  understanding of the education provided. See Electronic Medical Record under Patient Instructions for exercises provided during therapy sessions    ASSESSMENT   Pt tolerated treatment well today with no complaints of pain. Will continue with UE strengthening as tolerated.     José Miguel Is progressing well towards his goals.   Patient prognosis is Excellent.     Patient will continue to benefit from skilled outpatient physical therapy to address the deficits listed in the problem list box on initial evaluation, provide patient/family education and to maximize patient's level of independence in the home and community environment.     Patient's spiritual, cultural and educational needs considered and patient agreeable to plan of care and goals.     Anticipated barriers to physical therapy: none    Goals:   Short Term Goals: 6 weeks   Independent with home exercise program . MET  Increase left elbow range of motion  deg. MET        Long Term Goals: 12 weeks   Increase left elbow to full range of motion  . Great progress  Report decreased left upper extremity pain < or =  3/10 with adls such as twisting his hand, opening his hand, opening a jar, cooking, dressing. MET  Increased manual muscle  test for left upper extremity to 4/5 muscle grade to promote proper scapular stability to decrease left upper extremity pain < or =  3/10 with activities of daily living such as twisting his hand, opening his hand, opening a jar, cooking, dressing.  Great progress  Patient to achieve CL (at least 60% < 80% impaired, limited or restricted) level on the Focus On Therapeutic Outcomes System. Great progress   Patient will improve  strength on left to 65# for improved chopping, cooking, and exercising. (NEW 10/4/2023)    PLAN     Continue with established Physical therapy plan of care and progress per patient tolerance and distal biceps tendon repair protocol.     Updated Certification Period: 10/4/2023 to 12/1/2023  Recommended Treatment Plan: 1-2 times per week for 8 weeks: Manual Therapy, Moist Heat/ Ice, Neuromuscular Re-ed, Patient Education, Therapeutic Activities, and Therapeutic Exercise    Teodoro Paez III, PTA

## 2023-10-12 ENCOUNTER — CLINICAL SUPPORT (OUTPATIENT)
Dept: REHABILITATION | Facility: OTHER | Age: 43
End: 2023-10-12
Payer: COMMERCIAL

## 2023-10-12 DIAGNOSIS — M62.81 MUSCLE WEAKNESS OF LEFT UPPER EXTREMITY: ICD-10-CM

## 2023-10-12 DIAGNOSIS — M25.622 DECREASED RANGE OF MOTION OF LEFT ELBOW: Primary | ICD-10-CM

## 2023-10-12 PROCEDURE — 97112 NEUROMUSCULAR REEDUCATION: CPT | Mod: PN,CQ

## 2023-10-12 PROCEDURE — 97110 THERAPEUTIC EXERCISES: CPT | Mod: PN,CQ

## 2023-10-12 PROCEDURE — 97530 THERAPEUTIC ACTIVITIES: CPT | Mod: PN,CQ

## 2023-10-16 NOTE — PROGRESS NOTES
"[facility]   Physical Therapy Treatment Note     Name: José Miguel Hilton  Clinic Number: [id number]    Therapy Diagnosis:   Encounter Diagnoses   Name Primary?    Decreased range of motion of left elbow Yes    Muscle weakness of left upper extremity        Physician: Tuan Mcguire PA-C    Visit Date: 10/17/2023    Physician Orders: Physical Therapy Evaluation and Treatreatent. Distal biceps tendon protocol  Medical Diagnosis: S46.212A (ICD-10-CM) - Rupture of distal biceps tendon, left, initial encounter  Evaluation Date: 7/25/2023  Authorization Period Expiration: 12/31/2023  Plan of Care Certification Period: 10/4/2023 - 12/1/2023   Visit # / Visits authorized: 18 (17/ 20)      Re-assessment: due 11/4/2023  Focus On therapeutic Outcomes (FOTO): 10/4/2023 (3/5; 3)    PTA Visit #: 2/5     10/3/2023 - post op week 12    Precautions: Standard    Time In: 7:44 am    Time Out: 8:37 pm   Total Billable Time: 50 minutes    SUBJECTIVE   Patient reports: "My shoulder feels a lot better"    He was compliant with home exercise program.  Response to previous treatment: No issues  Functional change: easier to lift things    Pain: 0/10   Location: left arm/elbow      OBJECTIVE     Objective Measures updated at progress report unless specified.     TREATMENT     José Miguel received the treatments listed below:      received therapeutic exercises to develop strength and ROM for 27 minutes including:  [x] Upper body ergonometer (level 1) alternating forward/back each minute x 6 minutes  [] Green foam ball squeeze 30x 5 second holds   [] Green band finger extension 30x   [] Gentle left wrist flexor stretch 3 x 30"   [] Scapular retractions 20 x 5"   [] Shoulder shrugs 20 x 5"   [] Passive range of motion supination/pronation with elbow extended, elbow flexion/extension  [] Isometric tricep and bicep (gentle, 2 fingers) activation in flexion, midrange, and extension 10x 5 second holds each  [x] Side lying shoulder " external rotation 3x 10 repetitions with 3#   [x] Side lying shoulder flexion with 3# weight 3x 10    [x] Side lying shoulder horizontal abduction with 3# weight 3x 10    [x] Side lying shoulder abduction with 3# weight 3x 10      [x] Bicep curl 2x 10 with 4# weight    [x] Hammer curl with 4# weight 2x 10    [] Supination/pronation with 3# weight 3 x 10      [] Rows with green theraband x 30     [] Shoulder extension x 30 repetitions with Green Theratube   [x] Tricep extension with 4# weight 3x 10   [] Shoulder external rotation with Green Theratube 3 x 10 repetitions with shoulder adduction with thin green monster band     [] Bent over row with Red Theraband 2 x 10   [x] [place] (flexion/scaption/abduction) with 3# weight 2 x 10   [] Supine flexion Active Assisted Range of Motion with 1# dowel 2 x 10    received the following manual therapy techniques: Soft tissue Mobilization were applied to the: left foream/elbow for 00 minutes, including:  [] Gentle soft tissue mobilization to incision over lateral forearm      received the following dynamic functional therapeutic activities to improve functional performance for 15 minutes, including:  [] Re-assessment/updated plan of care/Focus On therapeutic Outcomes (FOTO)   [] Therabar (red) flexion/extension elbows flexed x 30   [] Therabar (red) flexion/extension elbows extended x 30   [] Yared twist with (red) therabar x 20 each   [x] Velcro board supination/pronation with composite  and pincher  x3 minutes each   [x] Overhead carry with 3# weight 2x 40 feet   [x] Zombie carries with 3# weight flexion/scaption 2x 40 feet each     received the following neuromuscular re-education activities to improve: Coordination and Posture for 8 minutes. The following activities were included:  [] Prone row 2 x 20 with 2#   [] Prone horizontal abduction 2 x 20 with 2#   [] Prone extension 2 x 20 with 2#   [] Prone flexion 2 x 20 with 2#   [] Prone scapular retraction 2 x 20     []  Supine alphabet (upper/lower case) x1 each with 3# weight    [] Serratus punch with 3#  3 x 10   [] Ball at wall (green weighted ball) closed kinetic chain alphabet upper/lower x1 each   [] Ball at wall (green weighted ball) closed kinetic chain circles, forward and lateral, counter clockwise counterclockwise x 30 each    [] Serratus presses on wall 3 x 10 repetitions    [x] +Serratus presses EOM 2 x 10  [] Wall push up x 10    [x] +Push up EOM x 10      PATIENT EDUCATION AND HOME EXERCISES     Home Exercises Provided and Patient Education Provided     Education provided:   - Continuance of HEP    Written Home Exercises Provided: Patient instructed to cont prior HEP. Exercises were reviewed and José Miguel was able to demonstrate them prior to the end of the session.  José Miguel demonstrated good  understanding of the education provided. See Electronic Medical Record under Patient Instructions for exercises provided during therapy sessions    ASSESSMENT   Pt tolerated an increase in weight with all exercises today well today with no complaints of pain. Progressed push ups and serratus presses to edge of mat. Will monitor pt response to treatment and continue to progress as tolerated.    José Miguel Is progressing well towards his goals.   Patient prognosis is Excellent.     Patient will continue to benefit from skilled outpatient physical therapy to address the deficits listed in the problem list box on initial evaluation, provide patient/family education and to maximize patient's level of independence in the home and community environment.     Patient's spiritual, cultural and educational needs considered and patient agreeable to plan of care and goals.     Anticipated barriers to physical therapy: none    Goals:   Short Term Goals: 6 weeks   Independent with home exercise program . MET  Increase left elbow range of motion  deg. MET        Long Term Goals: 12 weeks   Increase left elbow to full range of motion  . Great  progress  Report decreased left upper extremity pain < or =  3/10 with adls such as twisting his hand, opening his hand, opening a jar, cooking, dressing. MET  Increased manual muscle test for left upper extremity to 4/5 muscle grade to promote proper scapular stability to decrease left upper extremity pain < or =  3/10 with activities of daily living such as twisting his hand, opening his hand, opening a jar, cooking, dressing.  Great progress  Patient to achieve CL (at least 60% < 80% impaired, limited or restricted) level on the Focus On Therapeutic Outcomes System. Great progress   Patient will improve  strength on left to 65# for improved chopping, cooking, and exercising. (NEW 10/4/2023)    PLAN     Continue with established Physical therapy plan of care and progress per patient tolerance and distal biceps tendon repair protocol.     Updated Certification Period: 10/4/2023 to 12/1/2023  Recommended Treatment Plan: 1-2 times per week for 8 weeks: Manual Therapy, Moist Heat/ Ice, Neuromuscular Re-ed, Patient Education, Therapeutic Activities, and Therapeutic Exercise    Teodoro Paez III, PTA

## 2023-10-17 ENCOUNTER — CLINICAL SUPPORT (OUTPATIENT)
Dept: REHABILITATION | Facility: OTHER | Age: 43
End: 2023-10-17
Payer: COMMERCIAL

## 2023-10-17 DIAGNOSIS — M62.81 MUSCLE WEAKNESS OF LEFT UPPER EXTREMITY: ICD-10-CM

## 2023-10-17 DIAGNOSIS — M25.622 DECREASED RANGE OF MOTION OF LEFT ELBOW: Primary | ICD-10-CM

## 2023-10-17 PROCEDURE — 97112 NEUROMUSCULAR REEDUCATION: CPT | Mod: PN,CQ

## 2023-10-17 PROCEDURE — 97530 THERAPEUTIC ACTIVITIES: CPT | Mod: PN,CQ

## 2023-10-17 PROCEDURE — 97110 THERAPEUTIC EXERCISES: CPT | Mod: PN,CQ

## 2023-10-18 ENCOUNTER — HOSPITAL ENCOUNTER (OUTPATIENT)
Dept: RADIOLOGY | Facility: HOSPITAL | Age: 43
Discharge: HOME | End: 2023-10-18
Attending: ORTHOPAEDIC SURGERY
Payer: COMMERCIAL

## 2023-10-18 ENCOUNTER — OFFICE VISIT (OUTPATIENT)
Dept: SPORTS MEDICINE | Facility: CLINIC | Age: 43
End: 2023-10-18
Payer: COMMERCIAL

## 2023-10-18 VITALS
SYSTOLIC BLOOD PRESSURE: 135 MMHG | TEMPERATURE: 98 F | DIASTOLIC BLOOD PRESSURE: 80 MMHG | HEIGHT: 71 IN | HEART RATE: 87 BPM | BODY MASS INDEX: 26.68 KG/M2 | WEIGHT: 190.56 LBS

## 2023-10-18 DIAGNOSIS — Z98.890 S/P TENDON REPAIR: ICD-10-CM

## 2023-10-18 DIAGNOSIS — Z98.890 S/P TENDON REPAIR: Primary | ICD-10-CM

## 2023-10-18 PROCEDURE — 73080 X-RAY EXAM OF ELBOW: CPT | Mod: 26,LT,, | Performed by: RADIOLOGY

## 2023-10-18 PROCEDURE — 3079F DIAST BP 80-89 MM HG: CPT | Mod: CPTII,S$GLB,, | Performed by: ORTHOPAEDIC SURGERY

## 2023-10-18 PROCEDURE — 99999 PR PBB SHADOW E&M-EST. PATIENT-LVL III: ICD-10-PCS | Mod: PBBFAC,,, | Performed by: ORTHOPAEDIC SURGERY

## 2023-10-18 PROCEDURE — 1159F MED LIST DOCD IN RCRD: CPT | Mod: CPTII,S$GLB,, | Performed by: ORTHOPAEDIC SURGERY

## 2023-10-18 PROCEDURE — 3075F SYST BP GE 130 - 139MM HG: CPT | Mod: CPTII,S$GLB,, | Performed by: ORTHOPAEDIC SURGERY

## 2023-10-18 PROCEDURE — 99999 PR PBB SHADOW E&M-EST. PATIENT-LVL III: CPT | Mod: PBBFAC,,, | Performed by: ORTHOPAEDIC SURGERY

## 2023-10-18 PROCEDURE — 99213 OFFICE O/P EST LOW 20 MIN: CPT | Mod: S$GLB,,, | Performed by: ORTHOPAEDIC SURGERY

## 2023-10-18 PROCEDURE — 1159F PR MEDICATION LIST DOCUMENTED IN MEDICAL RECORD: ICD-10-PCS | Mod: CPTII,S$GLB,, | Performed by: ORTHOPAEDIC SURGERY

## 2023-10-18 PROCEDURE — 73080 X-RAY EXAM OF ELBOW: CPT | Mod: TC,LT

## 2023-10-18 PROCEDURE — 3075F PR MOST RECENT SYSTOLIC BLOOD PRESS GE 130-139MM HG: ICD-10-PCS | Mod: CPTII,S$GLB,, | Performed by: ORTHOPAEDIC SURGERY

## 2023-10-18 PROCEDURE — 3008F PR BODY MASS INDEX (BMI) DOCUMENTED: ICD-10-PCS | Mod: CPTII,S$GLB,, | Performed by: ORTHOPAEDIC SURGERY

## 2023-10-18 PROCEDURE — 3079F PR MOST RECENT DIASTOLIC BLOOD PRESSURE 80-89 MM HG: ICD-10-PCS | Mod: CPTII,S$GLB,, | Performed by: ORTHOPAEDIC SURGERY

## 2023-10-18 PROCEDURE — 3008F BODY MASS INDEX DOCD: CPT | Mod: CPTII,S$GLB,, | Performed by: ORTHOPAEDIC SURGERY

## 2023-10-18 PROCEDURE — 99213 PR OFFICE/OUTPT VISIT, EST, LEVL III, 20-29 MIN: ICD-10-PCS | Mod: S$GLB,,, | Performed by: ORTHOPAEDIC SURGERY

## 2023-10-18 PROCEDURE — 73080 XR ELBOW COMPLETE 3 VIEW LEFT: ICD-10-PCS | Mod: 26,LT,, | Performed by: RADIOLOGY

## 2023-10-19 ENCOUNTER — CLINICAL SUPPORT (OUTPATIENT)
Dept: REHABILITATION | Facility: OTHER | Age: 43
End: 2023-10-19
Payer: COMMERCIAL

## 2023-10-19 DIAGNOSIS — M25.622 DECREASED RANGE OF MOTION OF LEFT ELBOW: Primary | ICD-10-CM

## 2023-10-19 DIAGNOSIS — M62.81 MUSCLE WEAKNESS OF LEFT UPPER EXTREMITY: ICD-10-CM

## 2023-10-19 PROCEDURE — 97530 THERAPEUTIC ACTIVITIES: CPT | Mod: PN

## 2023-10-19 NOTE — PROGRESS NOTES
[facility]  Physical Therapy Discharge Note    Name: José Miguel Hilton  Clinic Number: [id number]    Therapy Diagnosis:   Encounter Diagnoses   Name Primary?    Decreased range of motion of left elbow Yes    Muscle weakness of left upper extremity      Physician: Tuan Mcguire PA-C    Physician Orders: Physical Therapy Evaluation and Treatreatent. Distal biceps tendon protocol  Medical Diagnosis: S46.212A (ICD-10-CM) - Rupture of distal biceps tendon, left, initial encounter  Evaluation Date: 7/25/2023    Date of Last visit: 10/19/2023  Total Visits Received: 20    Precautions: Standard    Time In: 0700  Time Out: 0723  Total Billable Time: 23 minutes    SUBJECTIVE   Patient reports: he saw Dr. Morris and patient is ready for discharge.    He was compliant with home exercise program.  Response to previous treatment:   Functional change: easier to lift things    Pain: 0/10   Location: left arm/elbow      OBJECTIVE     Objective Measures updated at progress report unless specified.     UE MMT R L   C3 Cervical side bend 5/5 5/5   C4 Shoulder Shrug 5/5  5/5   Shoulder flexion 5/5 5/5   Shoulder abduction 5/5 5/5   Shoulder IR 5/5 5/5   Shoulder ER 5/5 5/5   C5 Elbow flexion 5/5 5/5   C7 Elbow extension 5/5 5/5   C6 Wrist extension 5/5 5/5   Wrist flexion 5/5 5/5   Scapular protraction 5/5 5/5   Mid Traps 5/5 5/5   Lower traps 5/5 5/5     CMS Impairment/Limitation/Restriction for FOTO Elbow Survey    Therapist reviewed FOTO scores for José Miguel Hilton on 10/19/2023.   FOTO documents entered into [employer] - see Media section.    Limitation Score: 75%     José Miguel received the treatments listed below:      received therapeutic exercises to develop strength and ROM for 6 minutes including:  [x] Upper body ergonometer (level 1) alternating forward/back each minute x 6 minutes    received the following dynamic functional therapeutic activities to improve functional performance for 17 minutes, including:  [x]  Re-assessment/updated plan of care/Focus On therapeutic Outcomes (FOTO)           ASSESSMENT      Patient has progressed well in outpatient physical therapy and has achieved below listed goals. Discussed patient progression with exercise as he returns to his gym.    Discharge reason: Patient is now asymptomatic and Patient has met all of his goals    Discharge FOTO Score: 75%      Goals:   Short Term Goals: 6 weeks   Independent with home exercise program . MET  Increase left elbow range of motion  deg. MET        Long Term Goals: 12 weeks   Increase left elbow to full range of motion  . met  Report decreased left upper extremity pain < or =  3/10 with adls such as twisting his hand, opening his hand, opening a jar, cooking, dressing. MET  Increased manual muscle test for left upper extremity to 4/5 muscle grade to promote proper scapular stability to decrease left upper extremity pain < or =  3/10 with activities of daily living such as twisting his hand, opening his hand, opening a jar, cooking, dressing.  met  Patient to achieve CL (at least 60% < 80% impaired, limited or restricted) level on the Focus On Therapeutic Outcomes System. met   Patient will improve  strength on left to 65# for improved chopping, cooking, and exercising. met      PLAN   This patient is discharged from Physical Therapy      Braydon Velazquez, PT

## 2024-05-31 ENCOUNTER — OFFICE VISIT (OUTPATIENT)
Dept: URGENT CARE | Facility: CLINIC | Age: 44
End: 2024-05-31
Payer: COMMERCIAL

## 2024-05-31 VITALS
OXYGEN SATURATION: 99 % | DIASTOLIC BLOOD PRESSURE: 72 MMHG | SYSTOLIC BLOOD PRESSURE: 122 MMHG | RESPIRATION RATE: 19 BRPM | BODY MASS INDEX: 25.9 KG/M2 | HEART RATE: 111 BPM | HEIGHT: 71 IN | TEMPERATURE: 98 F | WEIGHT: 185 LBS

## 2024-05-31 DIAGNOSIS — M25.572 ACUTE LEFT ANKLE PAIN: ICD-10-CM

## 2024-05-31 DIAGNOSIS — S93.402A SPRAIN AND STRAIN OF LEFT ANKLE: Primary | ICD-10-CM

## 2024-05-31 DIAGNOSIS — S96.912A SPRAIN AND STRAIN OF LEFT ANKLE: Primary | ICD-10-CM

## 2024-05-31 DIAGNOSIS — S82.892A CLOSED AVULSION FRACTURE OF LEFT ANKLE, INITIAL ENCOUNTER: ICD-10-CM

## 2024-05-31 PROCEDURE — 99214 OFFICE O/P EST MOD 30 MIN: CPT | Mod: S$GLB,,, | Performed by: FAMILY MEDICINE

## 2024-05-31 PROCEDURE — 73610 X-RAY EXAM OF ANKLE: CPT | Mod: LT,S$GLB,, | Performed by: RADIOLOGY

## 2024-05-31 NOTE — PROGRESS NOTES
"Subjective:      Patient ID: José Miguel Hilton is a 43 y.o. male.    Vitals:  height is 5' 11" (1.803 m) and weight is 83.9 kg (185 lb). His oral temperature is 98.3 °F (36.8 °C). His blood pressure is 122/72 and his pulse is 111 (abnormal). His respiration is 19 and oxygen saturation is 99%.     Chief Complaint: Ankle Injury    Patient presents with left ankle injury, redness, swelling. Onset 1 day. Fell off scooter due to gravel . He did ice it and took ibuprofen this morning. He is unable to bear full weight. Tdap UTD(2016)    Ankle Injury   The incident occurred 12 to 24 hours ago. The injury mechanism is unknown. The pain is present in the left ankle. The quality of the pain is described as aching. The pain is at a severity of 7/10. The pain is moderate. Associated symptoms include an inability to bear weight, numbness and tingling. The symptoms are aggravated by movement and weight bearing. He has tried acetaminophen and ice for the symptoms. The treatment provided no relief.       Neurological:  Positive for numbness.      Objective:     Physical Exam   Abdominal: Normal appearance.   Musculoskeletal:         General: Swelling, tenderness and signs of injury present.      Comments: Marked edema and ecchymosis of left ankle laterally with swelling extending across ankle mortis , positive ttp posterior and superior to lateral malleolus and along anterior ankle inferior and medial to lateral malleolus.   Pt is unable to bear full weight/ walking is significantly painful when pushing off  Distal foot is sensate and with good capillary refill  Good dp and pt pulses     Neurological: He is alert. A sensory deficit (mild decreased sensation of distal foot laterally) is present. Gait abnormal.   Skin: Skin is warm and dry. Capillary refill takes less than 2 seconds. bruising   Psychiatric: His behavior is normal. Judgment and thought content normal.   Nursing note and vitals reviewed.      Assessment:     1. Sprain and " strain of left ankle    2. Acute left ankle pain    3. Closed avulsion fracture of left ankle, initial encounter (possible)       Plan:   The preliminary reading of your xray showed no acute fracture We will call you if the final read is different than what we discussed.     Sprain and strain of left ankle    Acute left ankle pain  -     X-Ray Ankle Complete Left; Future; Expected date: 05/31/2024  -     NON-PNEUMATIC WALKING BOOT FOR HOME USE  -     Ambulatory referral/consult to Orthopedics    Closed avulsion fracture of left ankle, initial encounter    Pt or guardian provided educational materials and instructions regarding their visit diagnosis.

## 2024-06-03 ENCOUNTER — HOSPITAL ENCOUNTER (OUTPATIENT)
Dept: RADIOLOGY | Facility: HOSPITAL | Age: 44
Discharge: HOME | End: 2024-06-03
Attending: NURSE PRACTITIONER
Payer: COMMERCIAL

## 2024-06-03 ENCOUNTER — OFFICE VISIT (OUTPATIENT)
Dept: ORTHOPEDICS | Facility: CLINIC | Age: 44
End: 2024-06-03
Payer: COMMERCIAL

## 2024-06-03 DIAGNOSIS — M25.572 ACUTE LEFT ANKLE PAIN: Primary | ICD-10-CM

## 2024-06-03 DIAGNOSIS — S99.912A INJURY OF LEFT ANKLE, INITIAL ENCOUNTER: Primary | ICD-10-CM

## 2024-06-03 DIAGNOSIS — M25.572 ACUTE LEFT ANKLE PAIN: ICD-10-CM

## 2024-06-03 PROCEDURE — 99213 OFFICE O/P EST LOW 20 MIN: CPT | Mod: S$GLB,,, | Performed by: NURSE PRACTITIONER

## 2024-06-03 PROCEDURE — 73610 X-RAY EXAM OF ANKLE: CPT | Mod: 26,LT,, | Performed by: RADIOLOGY

## 2024-06-03 PROCEDURE — 99999 PR PBB SHADOW E&M-EST. PATIENT-LVL II: CPT | Mod: PBBFAC,,, | Performed by: NURSE PRACTITIONER

## 2024-06-03 PROCEDURE — 1160F RVW MEDS BY RX/DR IN RCRD: CPT | Mod: CPTII,S$GLB,, | Performed by: NURSE PRACTITIONER

## 2024-06-03 PROCEDURE — 1159F MED LIST DOCD IN RCRD: CPT | Mod: CPTII,S$GLB,, | Performed by: NURSE PRACTITIONER

## 2024-06-03 PROCEDURE — 73610 X-RAY EXAM OF ANKLE: CPT | Mod: TC,LT
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
## 2024-06-11 ENCOUNTER — HOSPITAL ENCOUNTER (OUTPATIENT)
Dept: CARDIOLOGY | Facility: HOSPITAL | Age: 44
Discharge: HOME | End: 2024-06-11
Attending: NURSE PRACTITIONER
Payer: COMMERCIAL

## 2024-06-11 ENCOUNTER — HOSPITAL ENCOUNTER (OUTPATIENT)
Dept: RADIOLOGY | Facility: HOSPITAL | Age: 44
Discharge: HOME | End: 2024-06-11
Attending: NURSE PRACTITIONER
Payer: COMMERCIAL

## 2024-06-11 ENCOUNTER — OFFICE VISIT (OUTPATIENT)
Dept: ORTHOPEDICS | Facility: CLINIC | Age: 44
End: 2024-06-11
Payer: COMMERCIAL

## 2024-06-11 VITALS — HEIGHT: 71 IN | BODY MASS INDEX: 25.89 KG/M2 | WEIGHT: 184.94 LBS

## 2024-06-11 DIAGNOSIS — M25.572 ACUTE LEFT ANKLE PAIN: ICD-10-CM

## 2024-06-11 DIAGNOSIS — M25.572 ACUTE LEFT ANKLE PAIN: Primary | ICD-10-CM

## 2024-06-11 DIAGNOSIS — M25.572 PAIN OF JOINT OF LEFT ANKLE AND FOOT: ICD-10-CM

## 2024-06-11 DIAGNOSIS — M79.662 PAIN AND SWELLING OF LEFT LOWER LEG: ICD-10-CM

## 2024-06-11 DIAGNOSIS — E55.9 VITAMIN D INSUFFICIENCY: ICD-10-CM

## 2024-06-11 DIAGNOSIS — M79.89 PAIN AND SWELLING OF LEFT LOWER LEG: ICD-10-CM

## 2024-06-11 PROCEDURE — 99214 OFFICE O/P EST MOD 30 MIN: CPT | Mod: S$GLB,,, | Performed by: NURSE PRACTITIONER

## 2024-06-11 PROCEDURE — 3008F BODY MASS INDEX DOCD: CPT | Mod: CPTII,S$GLB,, | Performed by: NURSE PRACTITIONER

## 2024-06-11 PROCEDURE — 1159F MED LIST DOCD IN RCRD: CPT | Mod: CPTII,S$GLB,, | Performed by: NURSE PRACTITIONER

## 2024-06-11 PROCEDURE — 73610 X-RAY EXAM OF ANKLE: CPT | Mod: TC,LT

## 2024-06-11 PROCEDURE — 99999 PR PBB SHADOW E&M-EST. PATIENT-LVL III: CPT | Mod: PBBFAC,,, | Performed by: NURSE PRACTITIONER

## 2024-06-11 PROCEDURE — 73610 X-RAY EXAM OF ANKLE: CPT | Mod: 26,LT,, | Performed by: RADIOLOGY

## 2024-06-11 PROCEDURE — 1160F RVW MEDS BY RX/DR IN RCRD: CPT | Mod: CPTII,S$GLB,, | Performed by: NURSE PRACTITIONER

## 2024-06-11 PROCEDURE — 93971 EXTREMITY STUDY: CPT | Mod: LT

## 2024-06-11 PROCEDURE — 93971 EXTREMITY STUDY: CPT | Mod: 26,LT,, | Performed by: INTERNAL MEDICINE

## 2024-06-11 RX ORDER — NAPROXEN 500 MG/1
500 TABLET ORAL 2 TIMES DAILY WITH MEALS
Qty: 20 TABLET | Refills: 0 | Status: SHIPPED | OUTPATIENT
Start: 2024-06-11 | End: 2024-06-21
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
## 2024-06-13 ENCOUNTER — PATIENT MESSAGE (OUTPATIENT)
Dept: ORTHOPEDICS | Facility: CLINIC | Age: 44
End: 2024-06-13
Payer: COMMERCIAL

## 2024-06-13 ENCOUNTER — TELEPHONE (OUTPATIENT)
Dept: ORTHOPEDICS | Facility: CLINIC | Age: 44
End: 2024-06-13
Payer: COMMERCIAL

## 2024-06-13 NOTE — TELEPHONE ENCOUNTER
Called and cancel pt's MRI and rescheduled him with Dr. Shay for his left ankle pain. Pt was satisfied with new appt date/time.

## 2024-06-21 ENCOUNTER — HOSPITAL ENCOUNTER (OUTPATIENT)
Dept: RADIOLOGY | Facility: HOSPITAL | Age: 44
Discharge: HOME | End: 2024-06-21
Attending: ORTHOPAEDIC SURGERY
Payer: COMMERCIAL

## 2024-06-21 ENCOUNTER — OFFICE VISIT (OUTPATIENT)
Dept: ORTHOPEDICS | Facility: CLINIC | Age: 44
End: 2024-06-21
Payer: COMMERCIAL

## 2024-06-21 VITALS — BODY MASS INDEX: 25.89 KG/M2 | WEIGHT: 184.94 LBS | HEIGHT: 71 IN

## 2024-06-21 DIAGNOSIS — S93.402A SPRAIN OF LEFT ANKLE, UNSPECIFIED LIGAMENT, INITIAL ENCOUNTER: ICD-10-CM

## 2024-06-21 DIAGNOSIS — S92.122A CLOSED DISPLACED FRACTURE OF BODY OF LEFT TALUS, INITIAL ENCOUNTER: ICD-10-CM

## 2024-06-21 DIAGNOSIS — S92.122A CLOSED DISPLACED FRACTURE OF BODY OF LEFT TALUS, INITIAL ENCOUNTER: Primary | ICD-10-CM

## 2024-06-21 PROCEDURE — 73700 CT LOWER EXTREMITY W/O DYE: CPT | Mod: 26,LT,, | Performed by: INTERNAL MEDICINE

## 2024-06-21 PROCEDURE — 73700 CT LOWER EXTREMITY W/O DYE: CPT | Mod: TC,LT

## 2024-06-21 PROCEDURE — 99999 PR PBB SHADOW E&M-EST. PATIENT-LVL III: CPT | Mod: PBBFAC,,, | Performed by: ORTHOPAEDIC SURGERY
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
## 2024-06-24 ENCOUNTER — PATIENT MESSAGE (OUTPATIENT)
Dept: ORTHOPEDICS | Facility: CLINIC | Age: 44
End: 2024-06-24
Payer: COMMERCIAL

## 2024-06-24 DIAGNOSIS — S93.402A SPRAIN OF LEFT ANKLE, UNSPECIFIED LIGAMENT, INITIAL ENCOUNTER: ICD-10-CM

## 2024-06-24 DIAGNOSIS — S92.122A CLOSED DISPLACED FRACTURE OF BODY OF LEFT TALUS, INITIAL ENCOUNTER: Primary | ICD-10-CM

## 2024-07-08 ENCOUNTER — OFFICE VISIT (OUTPATIENT)
Dept: ORTHOPEDICS | Facility: CLINIC | Age: 44
End: 2024-07-08
Payer: COMMERCIAL

## 2024-07-08 ENCOUNTER — CLINICAL SUPPORT (OUTPATIENT)
Dept: REHABILITATION | Facility: OTHER | Age: 44
End: 2024-07-08
Payer: COMMERCIAL

## 2024-07-08 VITALS — WEIGHT: 184.94 LBS | HEIGHT: 71 IN | BODY MASS INDEX: 25.89 KG/M2

## 2024-07-08 DIAGNOSIS — S93.402A SPRAIN OF LEFT ANKLE, UNSPECIFIED LIGAMENT, INITIAL ENCOUNTER: ICD-10-CM

## 2024-07-08 DIAGNOSIS — S92.115D CLOSED NONDISPLACED FRACTURE OF NECK OF LEFT TALUS WITH ROUTINE HEALING, SUBSEQUENT ENCOUNTER: ICD-10-CM

## 2024-07-08 DIAGNOSIS — Z74.09 IMPAIRED FUNCTIONAL MOBILITY, BALANCE, GAIT, AND ENDURANCE: ICD-10-CM

## 2024-07-08 DIAGNOSIS — M25.672 DECREASED RANGE OF MOTION OF LEFT ANKLE: ICD-10-CM

## 2024-07-08 DIAGNOSIS — R26.89 ANTALGIC GAIT: Primary | ICD-10-CM

## 2024-07-08 DIAGNOSIS — S93.402A SPRAIN OF LEFT ANKLE, UNSPECIFIED LIGAMENT, INITIAL ENCOUNTER: Primary | ICD-10-CM

## 2024-07-08 PROCEDURE — 1160F RVW MEDS BY RX/DR IN RCRD: CPT | Mod: CPTII,S$GLB,, | Performed by: ORTHOPAEDIC SURGERY

## 2024-07-08 PROCEDURE — 97530 THERAPEUTIC ACTIVITIES: CPT | Mod: PN | Performed by: PHYSICAL THERAPIST

## 2024-07-08 PROCEDURE — 99999 PR PBB SHADOW E&M-EST. PATIENT-LVL III: CPT | Mod: PBBFAC,,, | Performed by: ORTHOPAEDIC SURGERY

## 2024-07-08 PROCEDURE — 3008F BODY MASS INDEX DOCD: CPT | Mod: CPTII,S$GLB,, | Performed by: ORTHOPAEDIC SURGERY

## 2024-07-08 PROCEDURE — 1159F MED LIST DOCD IN RCRD: CPT | Mod: CPTII,S$GLB,, | Performed by: ORTHOPAEDIC SURGERY

## 2024-07-08 PROCEDURE — 97162 PT EVAL MOD COMPLEX 30 MIN: CPT | Mod: PN | Performed by: PHYSICAL THERAPIST

## 2024-07-08 PROCEDURE — 99213 OFFICE O/P EST LOW 20 MIN: CPT | Mod: S$GLB,,, | Performed by: ORTHOPAEDIC SURGERY

## 2024-07-08 NOTE — PROGRESS NOTES
José Miguel Hilton  Returns today for follow-up.  This is a 43-year-old male who presented to me on 06/21/2024 for a left ankle injury that occurred about three weeks prior.  Initial x-rays did not reveal any obvious acute bone injury but subsequent x-ray revealed what was felt to be a fracture of the medial process of the talus.  He was referred for further evaluation.  I ordered a CT scan to better visualize the bony structures.  CT scan of the ankle was performed on 06/21/2024 and revealed a mildly comminuted impaction type fracture of the medial aspect of the talar neck and a small chip fracture of the anterior aspect of the medial malleolus.  I discussed the results of the CT scan on the phone with him in reassured him that this did not require any surgical intervention.  I recommended continued boot immobilization and protected weight-bearing to avoid any pain.  He returns today and reports that his swelling is improved and he has had some improvement in pain as well.    Examination:  There is still some moderate swelling of the left ankle compared to the right.  He has functional motion of his ankle and subtalar joint.  He reports some mild pain with inversion and eversion.  He has minimal tenderness over the anterolateral ligaments.  He does have some tenderness over the medial malleolus in the medial aspect of the talus which correlates with the fracture sites.    Impression:   1. Sprain of left ankle, unspecified ligament, initial encounter  Ambulatory referral/consult to Physical/Occupational Therapy      2. Closed nondisplaced fracture of neck of left talus with routine healing, subsequent encounter  Ambulatory referral/consult to Physical/Occupational Therapy            Recommendation:  It has been about four weeks since his injury and I think he is progressing as expected.  He is able to bear weight in the boot with out any significant pain.  As long as he has not having any increased pain with  weight-bearing, he can continue to progress his weight-bearing.  I am also going to have him start some physical therapy.    Follow-up in six weeks

## 2024-07-08 NOTE — LETTER
July 8, 2024    José Miguel Hilton  [address]  [facility] [address]             [facility] [address]  [address]  [facility] [address]  Phone: [phone] To whom it May concern,      Mr. Hilton was seen in Orthopedic Clinic for follow-up of his left talus fracture.  He is progressing as expected, but I would recommend continued remote work for the next six weeks while he continues to heal and recover.  He will be re-evaluated in six weeks.  If there are any questions please notify me.  Sincerely,       Cas Shay MD
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
## 2024-07-09 NOTE — PROGRESS NOTES
[facility]   Physical Therapy Treatment Note      Name: José Miguel Hilton  Clinic Number: [id number]    Therapy Diagnosis:   Encounter Diagnoses   Name Primary?    Antalgic gait Yes    Decreased range of motion of left ankle     Impaired functional mobility, balance, gait, and endurance      Physician: Cas Shay MD    Visit Date: 7/10/2024  Physician Orders: PT Eval and Treat: Ankle sprain and talus fracture  Medical Diagnosis from Referral: S93.402A (ICD-10-CM) - Sprain of left ankle, unspecified ligament, initial encounter S92.115D (ICD-10-CM) - Closed nondisplaced fracture of neck of left talus with routine healing, subsequent encounter  Evaluation Date: 7/8/2024  Authorization Period Expiration: 7/8/2025  Plan of Care Expiration: 9/6/2024  Progress Note Due: 8/6/2024  Date of Surgery: n/a  Visit # / Visits authorized: 2/ 21   FOTO: 1/ 3     Precautions: Standard and Weightbearing      Time In: 0920  Time Out: 1015  Total Billable Time: 45 minutes    PTA Visit #: 0/5       Subjective     Patient reports: doing ok, went to the office for a little yesterday and was able to walk around in the boot and propped his foot up on some boxes at his desk.  He was compliant with home exercise program.  Response to previous treatment: no change with eval  Functional change: ongoing    Pain: 2/10  Location: Left medial>lateral ankle     Objective      Objective Measures updated at progress report unless specified.     Treatment     José Miguel received the treatments listed below:      therapeutic exercises to develop strength, endurance, ROM, flexibility, posture, and core stabilization for 15 minutes including:  Ankle circles 20x cw/ccw  Ankle alphabet  Ankle 4-way x RTB x 20 each  Golf ball STM x 2 min    manual therapy techniques: Joint mobilizations, Soft tissue Mobilization, and taping were applied to the: L ankle for 0 minutes, including:  None today    neuromuscular re-education activities to  improve: Balance, Coordination, Kinesthetic, Sense, Proprioception, and Posture for 30 minutes. The following activities were included:  Towel scrunches x 20  Towel scrunch with DF x 20  Toe yoga 20x  Arch exerciser 20x  [place]  x 2 min each inv/ev  Seated heel raise 20x L  Wobble board 20x DF/PF, 20x inv/ev      therapeutic activities to improve functional performance for 0  minutes, including:  None today        cold pack for 10 minutes to L ankle at end of session.    Patient Education and Home Exercises       Education provided:   - therex rationale    Written Home Exercises Provided: yes. Exercises were reviewed and José Miguel was able to demonstrate them prior to the end of the session.  José Miguel demonstrated good  understanding of the education provided. See Electronic Medical Record under Patient Instructions for exercises provided during therapy sessions    Assessment     Good tolerance to initial tx session. Difficulty with motor control particularly with eversion, but good tolerance overall. Difficulty with toe isolation and curls.     José Miguel Is progressing well towards his goals.   Patient prognosis is Good.     Patient will continue to benefit from skilled outpatient physical therapy to address the deficits listed in the problem list box on initial evaluation, provide pt/family education and to maximize pt's level of independence in the home and community environment.     Patient's spiritual, cultural and educational needs considered and pt agreeable to plan of care and goals.     Anticipated barriers to physical therapy: standard    Goals: updated 07/10/2024  Short Term Goals (4 Weeks):   1. Patient to have improved L dorsiflexion PROM by 25% or greater for ease with ADL's such as stair descent (Progressing, not met)   2. Patient to have improved single limb balance as noted by 10 sec or greater ea LE for improved gait stability (Progressing, not met)  3. Patient to report decreased pain in L ankle by 20%  or greater for independence with ADL's (Progressing, not met)     Long Term Goals (8 Weeks):   1. Patient to have improved subjective report of disability as noted by a score of 74% or greater on the FOTO ankle/foot questionnaire (Progressing, not met)  2. Patient to be independent with home exercise program for improved self management of condition (Progressing, not met)  3. Patient will have increased L ankle strength to 4+/5 or greater for independence with ADL's such as squatting (Progressing, not met)  4. Patient will report gradual return to gym exercise without pain exacerbation for return to PLOF. (Progressing, not met)    Plan   Plan of care Certification: 7/8/2024 to 9/6/2024.  Continue per plan of care with focus on L ankle ROM and strength. Gentle weightbearing activities as tolerated.     Valentina Vasquez, PT

## 2024-07-10 ENCOUNTER — CLINICAL SUPPORT (OUTPATIENT)
Dept: REHABILITATION | Facility: OTHER | Age: 44
End: 2024-07-10
Payer: COMMERCIAL

## 2024-07-10 DIAGNOSIS — Z74.09 IMPAIRED FUNCTIONAL MOBILITY, BALANCE, GAIT, AND ENDURANCE: ICD-10-CM

## 2024-07-10 DIAGNOSIS — M25.672 DECREASED RANGE OF MOTION OF LEFT ANKLE: ICD-10-CM

## 2024-07-10 DIAGNOSIS — R26.89 ANTALGIC GAIT: Primary | ICD-10-CM

## 2024-07-10 PROCEDURE — 97112 NEUROMUSCULAR REEDUCATION: CPT | Mod: PN | Performed by: PHYSICAL THERAPIST

## 2024-07-10 PROCEDURE — 97110 THERAPEUTIC EXERCISES: CPT | Mod: PN | Performed by: PHYSICAL THERAPIST

## 2024-07-15 ENCOUNTER — CLINICAL SUPPORT (OUTPATIENT)
Dept: REHABILITATION | Facility: OTHER | Age: 44
End: 2024-07-15
Payer: COMMERCIAL

## 2024-07-15 DIAGNOSIS — M25.672 DECREASED RANGE OF MOTION OF LEFT ANKLE: ICD-10-CM

## 2024-07-15 DIAGNOSIS — Z74.09 IMPAIRED FUNCTIONAL MOBILITY, BALANCE, GAIT, AND ENDURANCE: ICD-10-CM

## 2024-07-15 DIAGNOSIS — R26.89 ANTALGIC GAIT: Primary | ICD-10-CM

## 2024-07-15 PROCEDURE — 97110 THERAPEUTIC EXERCISES: CPT | Mod: PN

## 2024-07-15 PROCEDURE — 97112 NEUROMUSCULAR REEDUCATION: CPT | Mod: PN

## 2024-07-15 PROCEDURE — 97530 THERAPEUTIC ACTIVITIES: CPT | Mod: PN

## 2024-07-15 PROCEDURE — 97140 MANUAL THERAPY 1/> REGIONS: CPT | Mod: PN

## 2024-07-15 NOTE — PROGRESS NOTES
"[facility]   Physical Therapy Treatment Note      Name: José Miguel Hilton  Clinic Number: [id number]    Therapy Diagnosis:   Encounter Diagnoses   Name Primary?    Antalgic gait Yes    Decreased range of motion of left ankle     Impaired functional mobility, balance, gait, and endurance      Physician: Cas Shay MD    Visit Date: 7/15/2024  Physician Orders: PT Eval and Treat: Ankle sprain and talus fracture  Medical Diagnosis from Referral: S93.402A (ICD-10-CM) - Sprain of left ankle, unspecified ligament, initial encounter S92.115D (ICD-10-CM) - Closed nondisplaced fracture of neck of left talus with routine healing, subsequent encounter  Evaluation Date: 7/8/2024  Authorization Period Expiration: 7/8/2025  Plan of Care Expiration: 9/6/2024  Progress Note Due: 8/6/2024  Date of Surgery: n/a  Visit # / Visits authorized: 3/ 21   FOTO: 1/ 3     Precautions: Standard and Weightbearing      Time In: 12:15pm  Time Out: 1:15pm  Total Billable Time: 60 minutes    PTA Visit #: 0/5       Subjective     Patient reports: good today. Leaving Wednesday to go to a work conference in [place].  He was compliant with home exercise program.  Response to previous treatment: no change with eval  Functional change: ongoing    Pain: 2/10  Location: Left medial>lateral ankle     Objective      Objective Measures updated at progress report unless specified.     Treatment     José Miguel received the treatments listed below:      therapeutic exercises to develop strength, endurance, ROM, flexibility, posture, and core stabilization for 10 minutes including:  Ankle circles 20x cw/ccw - NP  Ankle alphabet - NP  Ankle 4-way x RTB x 20 each  +long sit G/S stretch 3 x 30"  Golf ball STM x 2 min    manual therapy techniques: Joint mobilizations, Soft tissue Mobilization, and taping were applied to the: L ankle for 15 minutes, including:  Calcaneal arc glides, TCJ AP/PA glides, mid/forefoot AP/PA glides  IASTYM to PFM, " "FHLT, 5th met head    neuromuscular re-education activities to improve: Balance, Coordination, Kinesthetic, Sense, Proprioception, and Posture for 25 minutes. The following activities were included:  Towel scrunches x 20  Towel scrunch with DF x 20  Toe yoga x1x10  +great toe ext MWM x 2 min x orange loop    Arch exerciser 20x  [place]  x 2 min each inv/ev  Seated heel raise 20x L - over half moon today  Wobble board 20x DF/PF, 20x inv/ev        therapeutic activities to improve functional performance for 10  minutes, including:  +Seated short arch x 20 x 5" holds  +Standing short arch x 20 x 5" holds  +SLS w/ short arch x 20 x 5" holds         cold pack for 10 minutes to L ankle at end of session. -- defer    Patient Education and Home Exercises       Education provided:   - therex rationale    Written Home Exercises Provided: yes. Exercises were reviewed and José Miguel was able to demonstrate them prior to the end of the session.  José Miguel demonstrated good  understanding of the education provided. See Electronic Medical Record under Patient Instructions for exercises provided during therapy sessions    Assessment     Added gastroc stretch to promote DF AROM and MFM on posterior lower leg. Progressed short arch elevations into CKC from sitting to SLS to promote stabilization with balance without donning boot. Good tolerance with marked fatigue in foot intrinsics, longitudinal arch. Required HHAx1-2 with SL balance due to continued multidirectional ankle weakness limiting overall stability, indep gait w/o boot. Reviewed HEP for understanding, modifications prn while OOT.    José Miguel Is progressing well towards his goals.   Patient prognosis is Good.     Patient will continue to benefit from skilled outpatient physical therapy to address the deficits listed in the problem list box on initial evaluation, provide pt/family education and to maximize pt's level of independence in the home and community environment. "     Patient's spiritual, cultural and educational needs considered and pt agreeable to plan of care and goals.     Anticipated barriers to physical therapy: standard    Goals: updated 07/16/2024  Short Term Goals (4 Weeks):   1. Patient to have improved L dorsiflexion PROM by 25% or greater for ease with ADL's such as stair descent (Progressing, not met)   2. Patient to have improved single limb balance as noted by 10 sec or greater ea LE for improved gait stability (Progressing, not met)  3. Patient to report decreased pain in L ankle by 20% or greater for independence with ADL's (Progressing, not met)     Long Term Goals (8 Weeks):   1. Patient to have improved subjective report of disability as noted by a score of 74% or greater on the FOTO ankle/foot questionnaire (Progressing, not met)  2. Patient to be independent with home exercise program for improved self management of condition (Progressing, not met)  3. Patient will have increased L ankle strength to 4+/5 or greater for independence with ADL's such as squatting (Progressing, not met)  4. Patient will report gradual return to gym exercise without pain exacerbation for return to PLOF. (Progressing, not met)    Plan   Plan of care Certification: 7/8/2024 to 9/6/2024.  Continue per plan of care with focus on L ankle ROM and strength. Gentle weightbearing activities as tolerated.     Agnieszka Cox, PT

## 2024-07-23 ENCOUNTER — CLINICAL SUPPORT (OUTPATIENT)
Dept: REHABILITATION | Facility: OTHER | Age: 44
End: 2024-07-23
Payer: COMMERCIAL

## 2024-07-23 DIAGNOSIS — R26.89 ANTALGIC GAIT: Primary | ICD-10-CM

## 2024-07-23 DIAGNOSIS — M25.672 DECREASED RANGE OF MOTION OF LEFT ANKLE: ICD-10-CM

## 2024-07-23 DIAGNOSIS — Z74.09 IMPAIRED FUNCTIONAL MOBILITY, BALANCE, GAIT, AND ENDURANCE: ICD-10-CM

## 2024-07-23 PROCEDURE — 97110 THERAPEUTIC EXERCISES: CPT | Mod: PN,CQ

## 2024-07-23 PROCEDURE — 97112 NEUROMUSCULAR REEDUCATION: CPT | Mod: PN,CQ

## 2024-07-23 PROCEDURE — 97530 THERAPEUTIC ACTIVITIES: CPT | Mod: PN,CQ

## 2024-07-23 NOTE — PROGRESS NOTES
"[facility]   Physical Therapy Treatment Note      Name: José Miguel Hilton  [facility] Number: [id number]    Therapy Diagnosis:   Encounter Diagnoses   Name Primary?    Antalgic gait Yes    Decreased range of motion of left ankle     Impaired functional mobility, balance, gait, and endurance      Physician: Cas Shay MD    Visit Date: 7/23/2024  Physician Orders: PT Eval and Treat: Ankle sprain and talus fracture  Medical Diagnosis from Referral: S93.402A (ICD-10-CM) - Sprain of left ankle, unspecified ligament, initial encounter S92.115D (ICD-10-CM) - Closed nondisplaced fracture of neck of left talus with routine healing, subsequent encounter  Evaluation Date: 7/8/2024  Authorization Period Expiration: 7/8/2025  Plan of Care Expiration: 9/6/2024  Progress Note Due: 8/6/2024  Date of Surgery: n/a  Visit # / Visits authorized: 4/ 21   FOTO: 1/ 3     Precautions: Standard and Weightbearing      Time In: 3:05 pm  Time Out: 3:59 pm  Total Billable Time: 54 minutes    PTA Visit #: 1/5       Subjective     Patient reports: he did really well when traveling to [place], and he took breaks, iced, and elevated as needed. States he is having no pain today.     He was compliant with home exercise program.  Response to previous treatment: good   Functional change: ongoing     Pain: 0/10  Location: Left medial>lateral ankle     Objective      Objective Measures updated at progress report unless specified.     Treatment     José Miguel received the treatments listed below:      therapeutic exercises to develop strength, endurance, ROM, flexibility, posture, and core stabilization for 08 minutes including:  Ankle circles 20x cw/ccw - NP  Ankle alphabet - NP  Ankle 4-way x RTB x 20 each   Seated Long sit G/S stretch 3 x 30"  Golf ball STM x 2 min    manual therapy techniques: Joint mobilizations, Soft tissue Mobilization, and taping were applied to the: L ankle for 00 minutes, including:  Calcaneal arc glides, " "TCJ AP/PA glides, mid/forefoot AP/PA glides  IASTYM to PFM, FHLT, 5th met head    neuromuscular re-education activities to improve: Balance, Coordination, Kinesthetic, Sense, Proprioception, and Posture for 23 minutes. The following activities were included:  Towel scrunches x 20  Towel scrunch with DF x 20   Toe yoga x1x10  great toe ext MWM x 2 min x pink loop     Arch exerciser 20x   [place]  x 2 bucket each inv/ev   Seated heel raise 20x L - over half moon today   [place]ble board 20x DF/PF, 20x inv/ev         therapeutic activities to improve functional performance for 23 minutes, including:  Seated short arch x 20 x 5" holds  Standing short arch x 20 x 5" holds  SLS w/ short arch x 20 x 5" holds   + SLS w/ short arch hold 3 x 30" L   + Education provided on RICE, exercise technique, and wound healing process.   + collected hx and subjective     cold pack for 10 minutes to L ankle at end of session. -- defer    Patient Education and Home Exercises       Education provided:   - therex rationale    Written Home Exercises Provided: yes. Exercises were reviewed and José Miguel was able to demonstrate them prior to the end of the session.  José Miguel demonstrated good  understanding of the education provided. See Electronic Medical Record under Patient Instructions for exercises provided during therapy sessions    Assessment     Overall good tolerance with progressions today with no adverse effects. Progressed balance today without boot, which pt displayed good balance strategies and required x 1 HHA. Consider rocker board series nv as tolerated. Continue to monitor and progress pt as tolerated.     José Miguel Is progressing well towards his goals.   Patient prognosis is Good.     Patient will continue to benefit from skilled outpatient physical therapy to address the deficits listed in the problem list box on initial evaluation, provide pt/family education and to maximize pt's level of independence in the home and community " environment.     Patient's spiritual, cultural and educational needs considered and pt agreeable to plan of care and goals.     Anticipated barriers to physical therapy: standard    Goals: updated 07/23/2024  Short Term Goals (4 Weeks):   1. Patient to have improved L dorsiflexion PROM by 25% or greater for ease with ADL's such as stair descent (Progressing, not met)   2. Patient to have improved single limb balance as noted by 10 sec or greater ea LE for improved gait stability (Progressing, not met)  3. Patient to report decreased pain in L ankle by 20% or greater for independence with ADL's (Progressing, not met)     Long Term Goals (8 Weeks):   1. Patient to have improved subjective report of disability as noted by a score of 74% or greater on the FOTO ankle/foot questionnaire (Progressing, not met)  2. Patient to be independent with home exercise program for improved self management of condition (Progressing, not met)  3. Patient will have increased L ankle strength to 4+/5 or greater for independence with ADL's such as squatting (Progressing, not met)  4. Patient will report gradual return to gym exercise without pain exacerbation for return to PLOF. (Progressing, not met)    Plan   Plan of care Certification: 7/8/2024 to 9/6/2024.  Continue per plan of care with focus on L ankle ROM and strength. Gentle weightbearing activities as tolerated.     Echo Robertson, PTA

## 2024-07-25 ENCOUNTER — DOCUMENTATION ONLY (OUTPATIENT)
Dept: REHABILITATION | Facility: OTHER | Age: 44
End: 2024-07-25
Payer: COMMERCIAL

## 2024-07-25 ENCOUNTER — CLINICAL SUPPORT (OUTPATIENT)
Dept: REHABILITATION | Facility: OTHER | Age: 44
End: 2024-07-25
Payer: COMMERCIAL

## 2024-07-25 DIAGNOSIS — M25.672 DECREASED RANGE OF MOTION OF LEFT ANKLE: ICD-10-CM

## 2024-07-25 DIAGNOSIS — R26.89 ANTALGIC GAIT: Primary | ICD-10-CM

## 2024-07-25 DIAGNOSIS — Z74.09 IMPAIRED FUNCTIONAL MOBILITY, BALANCE, GAIT, AND ENDURANCE: ICD-10-CM

## 2024-07-25 PROCEDURE — 97110 THERAPEUTIC EXERCISES: CPT | Mod: PN

## 2024-07-25 PROCEDURE — 97112 NEUROMUSCULAR REEDUCATION: CPT | Mod: PN

## 2024-07-25 PROCEDURE — 97530 THERAPEUTIC ACTIVITIES: CPT | Mod: PN

## 2024-07-25 NOTE — PROGRESS NOTES
Physical Therapist and Physical Therapist Assistant met face to face to discuss patient's treatment plan and progress towards established goals. Pt will be seen by a physical therapist minimally every 6th visit or every 30 days.    Echo Robertson PTA  07/25/2024

## 2024-07-25 NOTE — PROGRESS NOTES
"[facility]   Physical Therapy Treatment Note      Name: José Miguel Hilton  [facility] Number: [id number]    Therapy Diagnosis:   Encounter Diagnoses   Name Primary?    Antalgic gait Yes    Decreased range of motion of left ankle     Impaired functional mobility, balance, gait, and endurance      Physician: Cas Shay MD    Visit Date: 7/25/2024  Physician Orders: PT Eval and Treat: Ankle sprain and talus fracture  Medical Diagnosis from Referral: S93.402A (ICD-10-CM) - Sprain of left ankle, unspecified ligament, initial encounter S92.115D (ICD-10-CM) - Closed nondisplaced fracture of neck of left talus with routine healing, subsequent encounter  Evaluation Date: 7/8/2024  Authorization Period Expiration: 7/8/2025  Plan of Care Expiration: 9/6/2024  Progress Note Due: 8/6/2024  Date of Surgery: n/a  Visit # / Visits authorized: 5/ 21   FOTO: 1/ 3     Precautions: Standard and Weightbearing      Time In: 2:15 pm  Time Out: 3:15 pm  Total Billable Time: 60 minutes    PTA Visit #: 1/5       Subjective     Patient reports: he did really well when traveling to [place], and he took breaks, iced, and elevated as needed. States he is having no pain today.     He was compliant with home exercise program.  Response to previous treatment: good   Functional change: ongoing     Pain: 0/10  Location: Left medial>lateral ankle     Objective      Objective Measures updated at progress report unless specified.     Treatment     José Miguel received the treatments listed below:      therapeutic exercises to develop strength, endurance, ROM, flexibility, posture, and core stabilization for 08 minutes including:  Ankle 4-way x GTB x 20 each   Seated Long sit G/S stretch 3 x 30" - changed to standing on incline board today  Golf ball STM x 2 min --- end of session 2* c/o PFM/post tib tension after SLS activities    manual therapy techniques: Joint mobilizations, Soft tissue Mobilization, and taping were applied to the: " "L ankle for 00 minutes, including:  Calcaneal arc glides, TCJ AP/PA glides, mid/forefoot AP/PA glides  IASTYM to PFM, FHLT, 5th met head    neuromuscular re-education activities to improve: Balance, Coordination, Kinesthetic, Sense, Proprioception, and Posture for 27 minutes. The following activities were included:  Toe yoga x1x10 - d/c to HEP after today  great toe ext MWM x 20x pink loop     Arch exerciser 20x   [place]  x 2 bucket each inv/ev   Seated heel raise x 2x10x over half moon - standing today    +standing rocker board AP dynamic x 20  +standing rocker board AP static x 1 min  +standing rocker board ML static x 1 min    +SLS slider circles x 15 cw, x 15 ccw    therapeutic activities to improve functional performance for 25 minutes, including:  Standing short arch x 10 x 10" holds  SLS w/ short arch x 20 x 5" holds -- over 2 plate weights today   SLS w/ short arch hold 3 x 30" L -- over 2 plate weights today    +shuttle DL x3x10 x 5 black cords  +shuttle SL x3x10 x 2 black/1red cords  +step ups x 20 x 6" step - no HHA    Patient Education and Home Exercises       Education provided:   - therex rationale    Written Home Exercises Provided: yes. Exercises were reviewed and José Miguel was able to demonstrate them prior to the end of the session.  José Miguel demonstrated good  understanding of the education provided. See Electronic Medical Record under Patient Instructions for exercises provided during therapy sessions    Assessment     Progressed CKC strength and balance today. Good tolerance overall with fatigue and notable tension in posterior medial ankle/heel. Progress as tolerated nvLuisa Valdez Is progressing well towards his goals.   Patient prognosis is Good.     Patient will continue to benefit from skilled outpatient physical therapy to address the deficits listed in the problem list box on initial evaluation, provide pt/family education and to maximize pt's level of independence in the home and community " environment.     Patient's spiritual, cultural and educational needs considered and pt agreeable to plan of care and goals.     Anticipated barriers to physical therapy: standard    Goals: updated 07/30/2024  Short Term Goals (4 Weeks):   1. Patient to have improved L dorsiflexion PROM by 25% or greater for ease with ADL's such as stair descent (Progressing, not met)   2. Patient to have improved single limb balance as noted by 10 sec or greater ea LE for improved gait stability (Progressing, not met)  3. Patient to report decreased pain in L ankle by 20% or greater for independence with ADL's (Progressing, not met)     Long Term Goals (8 Weeks):   1. Patient to have improved subjective report of disability as noted by a score of 74% or greater on the FOTO ankle/foot questionnaire (Progressing, not met)  2. Patient to be independent with home exercise program for improved self management of condition (Progressing, not met)  3. Patient will have increased L ankle strength to 4+/5 or greater for independence with ADL's such as squatting (Progressing, not met)  4. Patient will report gradual return to gym exercise without pain exacerbation for return to PLOF. (Progressing, not met)    Plan   Plan of care Certification: 7/8/2024 to 9/6/2024.  Continue per plan of care with focus on L ankle ROM and strength. Gentle weightbearing activities as tolerated.     Agnieszka Cox, PT

## 2024-07-30 ENCOUNTER — CLINICAL SUPPORT (OUTPATIENT)
Dept: REHABILITATION | Facility: OTHER | Age: 44
End: 2024-07-30
Payer: COMMERCIAL

## 2024-07-30 DIAGNOSIS — M25.672 DECREASED RANGE OF MOTION OF LEFT ANKLE: ICD-10-CM

## 2024-07-30 DIAGNOSIS — Z74.09 IMPAIRED FUNCTIONAL MOBILITY, BALANCE, GAIT, AND ENDURANCE: ICD-10-CM

## 2024-07-30 DIAGNOSIS — R26.89 ANTALGIC GAIT: Primary | ICD-10-CM

## 2024-07-30 PROCEDURE — 97112 NEUROMUSCULAR REEDUCATION: CPT | Mod: PN,CQ

## 2024-07-30 PROCEDURE — 97110 THERAPEUTIC EXERCISES: CPT | Mod: PN,CQ

## 2024-07-30 PROCEDURE — 97530 THERAPEUTIC ACTIVITIES: CPT | Mod: PN,CQ

## 2024-07-30 NOTE — PROGRESS NOTES
"[facility]   Physical Therapy Treatment Note      Name: José Miguel Hilton  [facility] Number: [id number]    Therapy Diagnosis:   Encounter Diagnoses   Name Primary?    Antalgic gait Yes    Decreased range of motion of left ankle     Impaired functional mobility, balance, gait, and endurance        Physician: Cas Shay MD    Visit Date: 7/30/2024  Physician Orders: PT Eval and Treat: Ankle sprain and talus fracture  Medical Diagnosis from Referral: S93.402A (ICD-10-CM) - Sprain of left ankle, unspecified ligament, initial encounter S92.115D (ICD-10-CM) - Closed nondisplaced fracture of neck of left talus with routine healing, subsequent encounter  Evaluation Date: 7/8/2024  Authorization Period Expiration: 7/8/2025  Plan of Care Expiration: 9/6/2024  Progress Note Due: 8/6/2024  Date of Surgery: n/a  Visit # / Visits authorized: 6/ 21   FOTO: 1/ 3     Precautions: Standard and Weightbearing      Time In: 4:00 pm  Time Out: 4:55 pm  Total Billable Time: 55 minutes    PTA Visit #: 1/5       Subjective     Patient reports: his ankle is feeling good today with no pain noted. States he walked out of his office today without the boot due to a quick answer to someone calling him from the other room, and it felt alright.     He was compliant with home exercise program.  Response to previous treatment: good   Functional change: ongoing     Pain: 0/10  Location: Left medial>lateral ankle     Objective      Objective Measures updated at progress report unless specified.     Treatment     José Miguel received the treatments listed below:      therapeutic exercises to develop strength, endurance, ROM, flexibility, posture, and core stabilization for 08 minutes including:  Ankle 4-way x GTB x 20 each   Seated Long sit G/S stretch 3 x 30" - changed to standing on incline board today  Golf ball STM x 2 min --- end of session 2* c/o PFM/post tib tension after great toe ext MWM x 20x pink loop   Arch exerciser 20x " "SLS activities    manual therapy techniques: Joint mobilizations, Soft tissue Mobilization, and taping were applied to the: L ankle for 00 minutes, including:  Calcaneal arc glides, TCJ AP/PA glides, mid/forefoot AP/PA glides  IASTYM to PFM, FHLT, 5th met head    neuromuscular re-education activities to improve: Balance, Coordination, Kinesthetic, Sense, Proprioception, and Posture for 23 minutes. The following activities were included:  Toe yoga x1x10 - d/c to HEP after today     [place]  x 2 bucket each inv/ev   Seated heel raise x 2x10x over half moon - standing today     standing rocker board AP dynamic x 20  +standing rocker board ML dynamic x 20  standing rocker board AP static x 1 min  standing rocker board ML static x 1 min    SLS slider circles x 15 cw, x 15 ccw     therapeutic activities to improve functional performance for 24 minutes, including:  Standing short arch x 10 x 10" holds  SLS w/ short arch x 20 x 5" holds -- over 2 plate weights today   SLS w/ short arch hold 3 x 30" L -- over 2 plate weights today    shuttle DL x3x10 x 5 black cords   shuttle SL x3x10 x 2 black/1red cords   step ups x 20 x 6" step - no HHA     Patient Education and Home Exercises       Education provided:   - therex rationale    Written Home Exercises Provided: yes. Exercises were reviewed and José Miguel was able to demonstrate them prior to the end of the session.  José Miguel demonstrated good  understanding of the education provided. See Electronic Medical Record under Patient Instructions for exercises provided during therapy sessions     Assessment     Good tolerance with treatment progressions with no adverse effects. VC given for arch lifts during dynamic exercises, and able to correct. Fatigue noted at end of session. Continue to monitor and progress pt as tolerated.     José Miguel Is progressing well towards his goals.   Patient prognosis is Good.     Patient will continue to benefit from skilled outpatient physical therapy to " address the deficits listed in the problem list box on initial evaluation, provide pt/family education and to maximize pt's level of independence in the home and community environment.     Patient's spiritual, cultural and educational needs considered and pt agreeable to plan of care and goals.     Anticipated barriers to physical therapy: standard    Goals: updated 07/30/2024  Short Term Goals (4 Weeks):   1. Patient to have improved L dorsiflexion PROM by 25% or greater for ease with ADL's such as stair descent (Progressing, not met)   2. Patient to have improved single limb balance as noted by 10 sec or greater ea LE for improved gait stability (Progressing, not met)  3. Patient to report decreased pain in L ankle by 20% or greater for independence with ADL's (Progressing, not met)     Long Term Goals (8 Weeks):   1. Patient to have improved subjective report of disability as noted by a score of 74% or greater on the FOTO ankle/foot questionnaire (Progressing, not met)  2. Patient to be independent with home exercise program for improved self management of condition (Progressing, not met)  3. Patient will have increased L ankle strength to 4+/5 or greater for independence with ADL's such as squatting (Progressing, not met)  4. Patient will report gradual return to gym exercise without pain exacerbation for return to PLOF. (Progressing, not met)    Plan   Plan of care Certification: 7/8/2024 to 9/6/2024.    Continue per plan of care with focus on L ankle ROM and strength. Gentle weightbearing activities as tolerated.     Echo Robertson, PTA

## 2024-08-01 ENCOUNTER — CLINICAL SUPPORT (OUTPATIENT)
Dept: REHABILITATION | Facility: OTHER | Age: 44
End: 2024-08-01
Payer: COMMERCIAL

## 2024-08-01 DIAGNOSIS — M25.672 DECREASED RANGE OF MOTION OF LEFT ANKLE: ICD-10-CM

## 2024-08-01 DIAGNOSIS — R26.89 ANTALGIC GAIT: Primary | ICD-10-CM

## 2024-08-01 DIAGNOSIS — Z74.09 IMPAIRED FUNCTIONAL MOBILITY, BALANCE, GAIT, AND ENDURANCE: ICD-10-CM

## 2024-08-01 PROCEDURE — 97110 THERAPEUTIC EXERCISES: CPT | Mod: PN | Performed by: PHYSICAL THERAPIST

## 2024-08-01 PROCEDURE — 97530 THERAPEUTIC ACTIVITIES: CPT | Mod: PN | Performed by: PHYSICAL THERAPIST

## 2024-08-01 PROCEDURE — 97112 NEUROMUSCULAR REEDUCATION: CPT | Mod: PN | Performed by: PHYSICAL THERAPIST

## 2024-08-01 NOTE — PROGRESS NOTES
"[facility]   Physical Therapy Treatment Note      Name: José Miguel Hilton  Clinic Number: [id number]    Therapy Diagnosis:   Encounter Diagnoses   Name Primary?    Antalgic gait Yes    Decreased range of motion of left ankle     Impaired functional mobility, balance, gait, and endurance        Physician: Cas Shay MD    Visit Date: 8/1/2024  Physician Orders: PT Eval and Treat: Ankle sprain and talus fracture  Medical Diagnosis from Referral: S93.402A (ICD-10-CM) - Sprain of left ankle, unspecified ligament, initial encounter S92.115D (ICD-10-CM) - Closed nondisplaced fracture of neck of left talus with routine healing, subsequent encounter  Evaluation Date: 7/8/2024  Authorization Period Expiration: 7/8/2025  Plan of Care Expiration: 9/6/2024  Progress Note Due: 8/6/2024  Date of Surgery: n/a  Visit # / Visits authorized: 7/ 21   FOTO: 1/ 3     Precautions: Standard and Weightbearing      Time In: 1615  Time Out: 1715  Total Billable Time: 60 minutes    PTA Visit #: 0/5       Subjective     Patient reports: doing pretty good overall, no significant changes.   Return to MD 8/19     He was compliant with home exercise program.  Response to previous treatment: good   Functional change: ongoing     Pain: 0/10  Location: Left medial>lateral ankle     Objective      Objective Measures updated at progress report unless specified.     Treatment     José Miguel received the treatments listed below:      therapeutic exercises to develop strength, endurance, ROM, flexibility, posture, and core stabilization for 08 minutes including:  Ankle 4-way x GTB x 20 each   Seated Long sit G/S stretch 3 x 30" - changed to standing on incline board today  Golf ball STM x 2 min --- end of session 2* c/o PFM/post tib tension after great toe ext MWM x 20x pink loop   Arch exerciser 20x SLS activities    manual therapy techniques: Joint mobilizations, Soft tissue Mobilization, and taping were applied to the: L ankle " "for 00 minutes, including:  Calcaneal arc glides, TCJ AP/PA glides, mid/forefoot AP/PA glides  IASTYM to PFM, FHLT, 5th met head    neuromuscular re-education activities to improve: Balance, Coordination, Kinesthetic, Sense, Proprioception, and Posture for 23 minutes. The following activities were included:  Toe yoga x1x10 - d/c to HEP after today     [place]  x 2 bucket each inv/ev   heel raise x 2x10x over half moon L     standing rocker board AP dynamic x 20  standing rocker board ML dynamic x 20  standing rocker board AP static x 1 min  standing rocker board ML static x 1 min  +[place]obo board 20x 1/2, 20x 3/4    SLS slider circles x 15 cw, x 15 ccw     therapeutic activities to improve functional performance for 24 minutes, including:  Standing short arch x 10 x 10" holds  SLS w/ short arch x 20 x 5" holds -- over 2 plate weights today   SLS w/ short arch hold 3 x 30" L -- over 2 plate weights today    shuttle DL x3x10 x 6 black cords   shuttle SL x3x10 x 4 black  cords   step ups x 30 x 6" plyobox +airex  +Hesitation march 2 x 40ft    Patient Education and Home Exercises       Education provided:   - therex rationale    Written Home Exercises Provided: yes. Exercises were reviewed and José Miguel was able to demonstrate them prior to the end of the session.  José Miguel demonstrated good  understanding of the education provided. See Electronic Medical Record under Patient Instructions for exercises provided during therapy sessions     Assessment     Good tolerance to continued gradual progression of therex. Verbal cuing for arch lift with balance exercises with good return demonstration. Continues with mild swelling noted to lateral malleolus, encouraged to continue use of compression to reduce.     José Miguel Is progressing well towards his goals.   Patient prognosis is Good.     Patient will continue to benefit from skilled outpatient physical therapy to address the deficits listed in the problem list box on initial " evaluation, provide pt/family education and to maximize pt's level of independence in the home and community environment.     Patient's spiritual, cultural and educational needs considered and pt agreeable to plan of care and goals.     Anticipated barriers to physical therapy: standard    Goals: updated 08/01/2024  Short Term Goals (4 Weeks):   1. Patient to have improved L dorsiflexion PROM by 25% or greater for ease with ADL's such as stair descent (Progressing, not met)   2. Patient to have improved single limb balance as noted by 10 sec or greater ea LE for improved gait stability (Progressing, not met)  3. Patient to report decreased pain in L ankle by 20% or greater for independence with ADL's (Progressing, not met)     Long Term Goals (8 Weeks):   1. Patient to have improved subjective report of disability as noted by a score of 74% or greater on the FOTO ankle/foot questionnaire (Progressing, not met)  2. Patient to be independent with home exercise program for improved self management of condition (Progressing, not met)  3. Patient will have increased L ankle strength to 4+/5 or greater for independence with ADL's such as squatting (Progressing, not met)  4. Patient will report gradual return to gym exercise without pain exacerbation for return to PLOF. (Progressing, not met)    Plan   Plan of care Certification: 7/8/2024 to 9/6/2024.    Continue per plan of care with focus on L ankle ROM and strength. Gentle weightbearing activities as tolerated.     Valentina Vasquez, PT

## 2024-08-06 ENCOUNTER — CLINICAL SUPPORT (OUTPATIENT)
Dept: REHABILITATION | Facility: OTHER | Age: 44
End: 2024-08-06
Payer: COMMERCIAL

## 2024-08-06 DIAGNOSIS — M25.672 DECREASED RANGE OF MOTION OF LEFT ANKLE: ICD-10-CM

## 2024-08-06 DIAGNOSIS — R26.89 ANTALGIC GAIT: Primary | ICD-10-CM

## 2024-08-06 DIAGNOSIS — Z74.09 IMPAIRED FUNCTIONAL MOBILITY, BALANCE, GAIT, AND ENDURANCE: ICD-10-CM

## 2024-08-06 PROCEDURE — 97110 THERAPEUTIC EXERCISES: CPT | Mod: PN,CQ

## 2024-08-06 PROCEDURE — 97112 NEUROMUSCULAR REEDUCATION: CPT | Mod: PN,CQ

## 2024-08-06 PROCEDURE — 97530 THERAPEUTIC ACTIVITIES: CPT | Mod: PN,CQ

## 2024-08-07 ENCOUNTER — DOCUMENTATION ONLY (OUTPATIENT)
Dept: REHABILITATION | Facility: OTHER | Age: 44
End: 2024-08-07
Payer: COMMERCIAL

## 2024-08-07 DIAGNOSIS — M25.672 DECREASED RANGE OF MOTION OF LEFT ANKLE: ICD-10-CM

## 2024-08-07 DIAGNOSIS — R26.89 ANTALGIC GAIT: Primary | ICD-10-CM

## 2024-08-07 DIAGNOSIS — Z74.09 IMPAIRED FUNCTIONAL MOBILITY, BALANCE, GAIT, AND ENDURANCE: ICD-10-CM

## 2024-08-08 ENCOUNTER — CLINICAL SUPPORT (OUTPATIENT)
Dept: REHABILITATION | Facility: OTHER | Age: 44
End: 2024-08-08
Payer: COMMERCIAL

## 2024-08-08 DIAGNOSIS — R26.89 ANTALGIC GAIT: Primary | ICD-10-CM

## 2024-08-08 DIAGNOSIS — M25.672 DECREASED RANGE OF MOTION OF LEFT ANKLE: ICD-10-CM

## 2024-08-08 DIAGNOSIS — Z74.09 IMPAIRED FUNCTIONAL MOBILITY, BALANCE, GAIT, AND ENDURANCE: ICD-10-CM

## 2024-08-08 PROCEDURE — 97112 NEUROMUSCULAR REEDUCATION: CPT | Mod: PN | Performed by: PHYSICAL THERAPIST

## 2024-08-08 PROCEDURE — 97530 THERAPEUTIC ACTIVITIES: CPT | Mod: PN | Performed by: PHYSICAL THERAPIST

## 2024-08-13 ENCOUNTER — CLINICAL SUPPORT (OUTPATIENT)
Dept: REHABILITATION | Facility: OTHER | Age: 44
End: 2024-08-13
Payer: COMMERCIAL

## 2024-08-13 DIAGNOSIS — R26.89 ANTALGIC GAIT: Primary | ICD-10-CM

## 2024-08-13 DIAGNOSIS — Z74.09 IMPAIRED FUNCTIONAL MOBILITY, BALANCE, GAIT, AND ENDURANCE: ICD-10-CM

## 2024-08-13 DIAGNOSIS — M25.672 DECREASED RANGE OF MOTION OF LEFT ANKLE: ICD-10-CM

## 2024-08-13 PROCEDURE — 97112 NEUROMUSCULAR REEDUCATION: CPT | Mod: PN | Performed by: PHYSICAL THERAPIST

## 2024-08-13 PROCEDURE — 97110 THERAPEUTIC EXERCISES: CPT | Mod: PN | Performed by: PHYSICAL THERAPIST

## 2024-08-13 PROCEDURE — 97530 THERAPEUTIC ACTIVITIES: CPT | Mod: PN | Performed by: PHYSICAL THERAPIST

## 2024-08-13 NOTE — PROGRESS NOTES
[facility]   Physical Therapy Treatment Note      Name: José Miguel Hilton  [facility] Number: [id number]    Therapy Diagnosis:   Encounter Diagnoses   Name Primary?    Antalgic gait Yes    Decreased range of motion of left ankle     Impaired functional mobility, balance, gait, and endurance        Physician: Cas Shay MD    Visit Date: 8/13/2024  Physician Orders: PT Eval and Treat: Ankle sprain and talus fracture  Medical Diagnosis from Referral: S93.402A (ICD-10-CM) - Sprain of left ankle, unspecified ligament, initial encounter S92.115D (ICD-10-CM) - Closed nondisplaced fracture of neck of left talus with routine healing, subsequent encounter  Evaluation Date: 7/8/2024  Authorization Period Expiration: 7/8/2025  Plan of Care Expiration: 9/6/2024  Progress Note Due: 9/6/2024  Date of Surgery: n/a  Visit # / Visits authorized: 10/ 21   FOTO: 2/ 3 last issued 8/8/2024     Precautions: Standard and Weightbearing      Time In: 1600   Time Out: 1655   Total Billable Time: 55 minutes    PTA Visit #: 0/5       Subjective     Patient reports: feeling good, no pain. He gets some tension to the arch, but feels good with stretching  Return to MD 8/20     He was compliant with home exercise program.  Response to previous treatment: good   Functional change: ongoing     Pain: 0/10  Location: Left medial>lateral ankle     Objective      Objective Measures updated at progress report unless specified.   8/8/2024       MMT Left Right     Ankle Plantarflexion 5/5 5/5     Ankle Dorsiflexion 5/5 5/5     Ankle Inversion 4+/5 5/5     Ankle Eversion 4/5 5/5     Knee Extension 5/5 5/5     Knee Flexion 5/5 5/5     Hip Flexion 5/5 5/5             Ankle   Right   Left Pain/Dysfunction with Movement     AROM PROM AROM PROM     Plantarflexion WFL   WFL     Dorsiflexion  Knee ext WFL   0 +5     Dorsiflexion  Knee Flex WFL   0 +5     Inversion WFL   WFL      Eversion WFL   WFL               CMS  "Impairment/Limitation/Restriction for FOTO Ankle Survey    Therapist reviewed FOTO scores for José Miguel Hilton on 8/8/2024.   FOTO documents entered into [employer] - see Media section.    Evaluation: 47%    Current : 63%    Goal: 74%         Treatment     José Miguel received the treatments listed below:      therapeutic exercises to develop strength, endurance, ROM, flexibility, posture, and core stabilization for 10 minutes including:  Ankle 4-way x GTB x 20 each   heel raise over half moon 30x B, 2 x 10 uni  GS SB 2 x 30"  +Soleus SB 2 x 30"  Golf ball STM x 2 min --- end of session 2* c/o PFM/post tib tension after   great toe ext MWM x 20x pink loop   Arch exerciser 20x SLS activities     manual therapy techniques: Joint mobilizations, Soft tissue Mobilization, and taping were applied to the: L ankle for 00 minutes, including:  Calcaneal arc glides, TCJ AP/PA glides, mid/forefoot AP/PA glides  IASTYM to PFM, FHLT, 5th met head    neuromuscular re-education activities to improve: Balance, Coordination, Kinesthetic, Sense, Proprioception, and Posture for 10 minutes. The following activities were included:  Toe yoga x1x10 - d/c to HEP after today     [place]  x 2 bucket each inv/ev       standing rocker board AP dynamic x 20  standing rocker board ML dynamic x 20  standing rocker board AP static x 1 min  standing rocker board ML static x 1 min  Mobo board 15x each plane, seated except for resistance at spot 2 vs GTB     SLS ball toss with yellow ball x30 ea fwd/lat on airex  +Step up into SLS on Bosu 20x L    SLS slider circles x 15 cw, x 15 ccw     therapeutic activities to improve functional performance for 35 minutes, including:    Standing short arch x 10 x 10" holds  SLS w/ short arch x 3x10 x 5" holds -- over 2 plate weights today -- with yellow pole for balance   SLS w/ short arch hold 3 x 30" L -- over 2 plate weights today    shuttle DL x3x10 x 6 black cords   shuttle SL x3x10 x 4 black  cords   step ups x 30 x 6" " plyobox + airex   +Step overs on Bosu 20x L    Hesitation march 2 x 40ft with uni 15# kb  Ankle flip 2 x 40ft with uni 15# kb  +Drinking bird 2 x 40 ft  +Walking lunge 2 x 40ft    Patient Education and Home Exercises       Education provided:   - therex rationale    Written Home Exercises Provided: yes. Exercises were reviewed and José Miguel was able to demonstrate them prior to the end of the session.  José Miguel demonstrated good  understanding of the education provided. See Electronic Medical Record under Patient Instructions for exercises provided during therapy sessions     Assessment     Good tolerance to progression of therex today. Added drinking birds and walking lunges with no c/o pain, appropriate training effect. Added airex to SLS with ball toss with intermittent toe touch required.     José Miguel Is progressing well towards his goals.   Patient prognosis is Good.     Patient will continue to benefit from skilled outpatient physical therapy to address the deficits listed in the problem list box on initial evaluation, provide pt/family education and to maximize pt's level of independence in the home and community environment.     Patient's spiritual, cultural and educational needs considered and pt agreeable to plan of care and goals.     Anticipated barriers to physical therapy: standard    Goals: updated 08/13/2024  Short Term Goals (4 Weeks):   1. Patient to have improved L dorsiflexion PROM by 25% or greater for ease with ADL's such as stair descent (met)   2. Patient to have improved single limb balance as noted by 10 sec or greater ea LE for improved gait stability (met)  3. Patient to report decreased pain in L ankle by 20% or greater for independence with ADL's (met)     Long Term Goals (8 Weeks):   1. Patient to have improved subjective report of disability as noted by a score of 74% or greater on the FOTO ankle/foot questionnaire (Progressing, not met)  2. Patient to be independent with home exercise program for  improved self management of condition (Progressing, not met)  3. Patient will have increased L ankle strength to 4+/5 or greater for independence with ADL's such as squatting (Progressing, not met)  4. Patient will report gradual return to gym exercise without pain exacerbation for return to PLOF. (Progressing, not met)    Plan   Plan of care Certification: 7/8/2024 to 9/6/2024.    Continue per plan of care with focus on L ankle ROM and strength. Gentle weightbearing activities as tolerated.     Valentina Vasquez, PT

## 2024-08-15 ENCOUNTER — CLINICAL SUPPORT (OUTPATIENT)
Dept: REHABILITATION | Facility: OTHER | Age: 44
End: 2024-08-15
Payer: COMMERCIAL

## 2024-08-15 DIAGNOSIS — Z74.09 IMPAIRED FUNCTIONAL MOBILITY, BALANCE, GAIT, AND ENDURANCE: ICD-10-CM

## 2024-08-15 DIAGNOSIS — R26.89 ANTALGIC GAIT: Primary | ICD-10-CM

## 2024-08-15 DIAGNOSIS — M25.672 DECREASED RANGE OF MOTION OF LEFT ANKLE: ICD-10-CM

## 2024-08-15 PROCEDURE — 97110 THERAPEUTIC EXERCISES: CPT | Mod: PN

## 2024-08-15 PROCEDURE — 97530 THERAPEUTIC ACTIVITIES: CPT | Mod: PN

## 2024-08-15 PROCEDURE — 97112 NEUROMUSCULAR REEDUCATION: CPT | Mod: PN

## 2024-08-15 NOTE — PROGRESS NOTES
[facility]   Physical Therapy Treatment Note      Name: José Miguel Hilton  [facility] Number: [id number]    Therapy Diagnosis:   Encounter Diagnoses   Name Primary?    Antalgic gait Yes    Decreased range of motion of left ankle     Impaired functional mobility, balance, gait, and endurance          Physician: Cas Shay MD    Visit Date: 8/15/2024  Physician Orders: PT Eval and Treat: Ankle sprain and talus fracture  Medical Diagnosis from Referral: S93.402A (ICD-10-CM) - Sprain of left ankle, unspecified ligament, initial encounter S92.115D (ICD-10-CM) - Closed nondisplaced fracture of neck of left talus with routine healing, subsequent encounter  Evaluation Date: 7/8/2024  Authorization Period Expiration: 7/8/2025  Plan of Care Expiration: 9/6/2024  Progress Note Due: 9/6/2024  Date of Surgery: n/a  Visit # / Visits authorized: 11/ 21   FOTO: 2/ 3 last issued 8/8/2024     Precautions: Standard and Weightbearing      Time In: 415pm   Time Out: 508pm   Total Billable Time: 53 minutes    PTA Visit #: 0/5       Subjective     Patient reports: no new complaints since previous visit. Leaving for a trip to [place] tomorrow.  F/U with MD 8/20/24; hoping to get rid of the boot by then. Able to do all daily activities without pain or difficulty while donning the boot. Able to walk for very short distances at home without boot and denies pain.    He was compliant with home exercise program.  Response to previous treatment: good   Functional change: ongoing     Pain: 0/10  Location: Left medial>lateral ankle     Objective      Objective Measures updated at progress report unless specified.   8/8/2024       MMT Left Right     Ankle Plantarflexion 5/5 5/5     Ankle Dorsiflexion 5/5 5/5     Ankle Inversion 4+/5 5/5     Ankle Eversion 4/5 5/5     Knee Extension 5/5 5/5     Knee Flexion 5/5 5/5     Hip Flexion 5/5 5/5             Ankle   Right   Left Pain/Dysfunction with Movement     AROM PROM AROM  "PROM     Plantarflexion WFL   WFL     Dorsiflexion  Knee ext WFL   0 +5     Dorsiflexion  Knee Flex WFL   0 +5     Inversion WFL   WFL      Eversion WFL   WFL               CMS Impairment/Limitation/Restriction for FOTO Ankle Survey    Therapist reviewed FOTO scores for José Miguel Hilton on 8/8/2024.   FOTO documents entered into [employer] - see Media section.    Evaluation: 47%    Current : 63%    Goal: 74%         Treatment     José Miguel received the treatments listed below:      therapeutic exercises to develop strength, endurance, ROM, flexibility, posture, and core stabilization for 10 minutes including:  heel raise over half moon 30x B, 2 x 10 uni  GS SB 2 x 1min  Soleus SB 2 x 30"    neuromuscular re-education activities to improve: Balance, Coordination, Kinesthetic, Sense, Proprioception, and Posture for 10 minutes. The following activities were included:  SLS ball toss with yellow ball x30 ea fwd/lat on airex  Step up into SLS on Bosu 20x L    therapeutic activities to improve functional performance for 33 minutes, including:    Step overs on Bosu 20x L    Hesitation march 2 x 40ft with uni 20# kb  Ankle flip 2 x 40ft with uni 20# kb  Drinking bird 2 x 40 ft  Walking lunge 2 x 40ft x 10# med ball  +inch worms x2x40 ft  +side stepping x2x40 ft x pink loop at forefoot    +SL squat with slider side lunge x3x10 B x 10# KB      Patient Education and Home Exercises       Education provided:   - therex rationale    Written Home Exercises Provided: yes. Exercises were reviewed and José Miguel was able to demonstrate them prior to the end of the session.  José Miguel demonstrated good  understanding of the education provided. See Electronic Medical Record under Patient Instructions for exercises provided during therapy sessions     Assessment     Progressed glute med and max strength and stabilization today. Mild muscle juddering in L quads during single leg squats, but able to perform with appropriate depth. Good tolerance with mm fatigue in " lateral hips.     José Miguel Is progressing well towards his goals.   Patient prognosis is Good.     Patient will continue to benefit from skilled outpatient physical therapy to address the deficits listed in the problem list box on initial evaluation, provide pt/family education and to maximize pt's level of independence in the home and community environment.     Patient's spiritual, cultural and educational needs considered and pt agreeable to plan of care and goals.     Anticipated barriers to physical therapy: standard    Goals: updated 08/15/2024  Short Term Goals (4 Weeks):   1. Patient to have improved L dorsiflexion PROM by 25% or greater for ease with ADL's such as stair descent (met)   2. Patient to have improved single limb balance as noted by 10 sec or greater ea LE for improved gait stability (met)  3. Patient to report decreased pain in L ankle by 20% or greater for independence with ADL's (met)     Long Term Goals (8 Weeks):   1. Patient to have improved subjective report of disability as noted by a score of 74% or greater on the FOTO ankle/foot questionnaire (Progressing, not met)  2. Patient to be independent with home exercise program for improved self management of condition (Progressing, not met)  3. Patient will have increased L ankle strength to 4+/5 or greater for independence with ADL's such as squatting (Progressing, not met)  4. Patient will report gradual return to gym exercise without pain exacerbation for return to PLOF. (Progressing, not met)    Plan   Plan of care Certification: 7/8/2024 to 9/6/2024.    Continue per plan of care with focus on L ankle ROM and strength. Gentle weightbearing activities as tolerated.     Agnieszka Cox, PT

## 2024-08-20 ENCOUNTER — OFFICE VISIT (OUTPATIENT)
Dept: ORTHOPEDICS | Facility: CLINIC | Age: 44
End: 2024-08-20
Payer: COMMERCIAL

## 2024-08-20 ENCOUNTER — CLINICAL SUPPORT (OUTPATIENT)
Dept: REHABILITATION | Facility: OTHER | Age: 44
End: 2024-08-20
Payer: COMMERCIAL

## 2024-08-20 VITALS — HEIGHT: 71 IN | WEIGHT: 184.94 LBS | BODY MASS INDEX: 25.89 KG/M2

## 2024-08-20 DIAGNOSIS — Z74.09 IMPAIRED FUNCTIONAL MOBILITY, BALANCE, GAIT, AND ENDURANCE: ICD-10-CM

## 2024-08-20 DIAGNOSIS — R26.89 ANTALGIC GAIT: Primary | ICD-10-CM

## 2024-08-20 DIAGNOSIS — S92.115D CLOSED NONDISPLACED FRACTURE OF NECK OF LEFT TALUS WITH ROUTINE HEALING, SUBSEQUENT ENCOUNTER: Primary | ICD-10-CM

## 2024-08-20 DIAGNOSIS — M25.672 DECREASED RANGE OF MOTION OF LEFT ANKLE: ICD-10-CM

## 2024-08-20 PROCEDURE — 99999 PR PBB SHADOW E&M-EST. PATIENT-LVL III: CPT | Mod: PBBFAC,,, | Performed by: ORTHOPAEDIC SURGERY

## 2024-08-20 PROCEDURE — 97112 NEUROMUSCULAR REEDUCATION: CPT | Mod: PN

## 2024-08-20 PROCEDURE — 97530 THERAPEUTIC ACTIVITIES: CPT | Mod: PN

## 2024-08-20 PROCEDURE — 1160F RVW MEDS BY RX/DR IN RCRD: CPT | Mod: CPTII,S$GLB,, | Performed by: ORTHOPAEDIC SURGERY

## 2024-08-20 PROCEDURE — 97110 THERAPEUTIC EXERCISES: CPT | Mod: PN

## 2024-08-20 PROCEDURE — 99213 OFFICE O/P EST LOW 20 MIN: CPT | Mod: S$GLB,,, | Performed by: ORTHOPAEDIC SURGERY

## 2024-08-20 PROCEDURE — 1159F MED LIST DOCD IN RCRD: CPT | Mod: CPTII,S$GLB,, | Performed by: ORTHOPAEDIC SURGERY

## 2024-08-20 PROCEDURE — 3008F BODY MASS INDEX DOCD: CPT | Mod: CPTII,S$GLB,, | Performed by: ORTHOPAEDIC SURGERY

## 2024-08-20 NOTE — PROGRESS NOTES
José Miguel Hilton  Returns today for follow-up.  This is a 43-year-old male who sustained injury to his left ankle about three months ago now.  Initial x-rays did not reveal any obvious acute bone injury but a subsequent x-ray revealed was felt to be a fracture of the medial process of the talus.  He was referred to me for further evaluation.  I saw him initially on 06/21/2024 and I ordered a CT scan to better visualize the bony structures.  The CT that was performed on 06/21/2024 revealed a mildly comminuted impaction type fracture of the medial aspect of the talar neck and a small chip fracture of the anterior aspect of the medial malleolus.  I recommended continued boot immobilization and protected weight-bearing to avoid any pain.  He returned to see me on 07/08/2024 at which time his swelling was improved in his pain was improving as well.  He was able to bear weight in the boot at that time without any pain so I would let him continue weight-bearing and I ordered some physical therapy.  He returns today and reports that he is essentially pain free with walking activities in and out of the boot.  He does report some continued swelling.  He reports that the therapy has been very helpful.    Examination: The left ankle does reveals some continued swelling compared to the right side.  He has functional motion of the ankle and subtalar joint without any pain.  There are no focal areas of tenderness.  There is no gross instability of the ankle.  He is neurovascularly intact.      Impression:  1. Closed nondisplaced fracture of neck of left talus with routine healing, subsequent encounter          Recommendation: It is encouraging that he is doing well at this point.  He can come out of the boot and progress low-impact activities as tolerated.  He should avoid any high impact activities for at least another three months.    Follow-up as needed.  If he has any setbacks or recurrence of pain he should call let us know.

## 2024-08-26 NOTE — PROGRESS NOTES
[facility]   Physical Therapy Treatment Note      Name: José Miguel Hilton  [facility] Number: [id number]    Therapy Diagnosis:   Encounter Diagnoses   Name Primary?    Antalgic gait Yes    Decreased range of motion of left ankle     Impaired functional mobility, balance, gait, and endurance        Physician: Cas Shay MD    Visit Date: 8/20/2024  Physician Orders: PT Eval and Treat: Ankle sprain and talus fracture  Medical Diagnosis from Referral: S93.402A (ICD-10-CM) - Sprain of left ankle, unspecified ligament, initial encounter S92.115D (ICD-10-CM) - Closed nondisplaced fracture of neck of left talus with routine healing, subsequent encounter  Evaluation Date: 7/8/2024  Authorization Period Expiration: 7/8/2025  Plan of Care Expiration: 9/6/2024  Progress Note Due: 9/6/2024  Date of Surgery: n/a  Visit # / Visits authorized: 12/ 21   FOTO: 2/ 3 last issued 8/8/2024     Precautions: Standard and Weightbearing      Time In: 400pm   Time Out: 500pm   Total Billable Time: 60 minutes    PTA Visit #: 0/5       Subjective     Patient reports: F/U with MD went well. He was able to d/c the boot and was transitioned into an ankle brace with instrructions to wean out of it prn. Able to return tolow impact activities but is not able to run or jump for another 3 mo.    He was compliant with home exercise program.  Response to previous treatment: good   Functional change: ongoing     Pain: 0/10  Location: Left medial>lateral ankle     Objective      Objective Measures updated at progress report unless specified.   8/8/2024       MMT Left Right     Ankle Plantarflexion 5/5 5/5     Ankle Dorsiflexion 5/5 5/5     Ankle Inversion 4+/5 5/5     Ankle Eversion 4/5 5/5     Knee Extension 5/5 5/5     Knee Flexion 5/5 5/5     Hip Flexion 5/5 5/5             Ankle   Right   Left Pain/Dysfunction with Movement     AROM PROM AROM PROM     Plantarflexion WFL   WFL     Dorsiflexion  Knee ext WFL   0 +5    "  Dorsiflexion  Knee Flex WFL   0 +5     Inversion WFL   WFL      Eversion WFL   WFL               CMS Impairment/Limitation/Restriction for FOTO Ankle Survey    Therapist reviewed FOTO scores for José Miguel Hilton on 8/8/2024.   FOTO documents entered into [employer] - see Media section.    Evaluation: 47%    Current : 63%    Goal: 74%         Treatment     José Miguel received the treatments listed below:      therapeutic exercises to develop strength, endurance, ROM, flexibility, posture, and core stabilization for 10 minutes including:  heel raise over half moon 30x B, 2 x 10 uni  GS SB 2 x 1min  Soleus SB 2 x 30"    neuromuscular re-education activities to improve: Balance, Coordination, Kinesthetic, Sense, Proprioception, and Posture for 10 minutes. The following activities were included:  SLS ball toss with yellow ball x30 ea fwd/lat on airex  Step up into SLS on Bosu 20x L    therapeutic activities to improve functional performance for 40 minutes, including:    Step overs on Bosu 20x L    Hesitation march 2 x 40ft with uni 20# kb  Ankle flip 2 x 40ft with uni 20# kb  Drinking bird 2 x 40 ft  Walking lunge 2 x 40ft x 10# med ball  Inch worms x2x40 ft  side stepping x2x40 ft x pink loop at forefoot    SL squat with slider side lunge x3x10 B x 10# KB  +step ups x20 x 12" plyo box  +step downs x 20 x 6" step      Patient Education and Home Exercises       Education provided:   - therex rationale    Written Home Exercises Provided: yes. Exercises were reviewed and José Miguel was able to demonstrate them prior to the end of the session.  José Miguel demonstrated good  understanding of the education provided. See Electronic Medical Record under Patient Instructions for exercises provided during therapy sessions     Assessment     Pt presents with functional ankle mobility, LE strength and is able to perform full therex program today without increased sx without donning lace up brace. Mild ankle sway with balance activities towards end of session " with increased fatigue, but with use of ankle strategy to maintain balance. Discussed prognosis, MD protocol as well as weaning away from donning the lace up brace, and PT POC. Pt opted to defer therapy services for 2-3 weeks to progress strength, flexibility, and balance independently at local gym. Will return in 203 weeks for reassessment on progressions and necessity of further care.    José Miguel Is progressing well towards his goals.   Patient prognosis is Good.     Patient will continue to benefit from skilled outpatient physical therapy to address the deficits listed in the problem list box on initial evaluation, provide pt/family education and to maximize pt's level of independence in the home and community environment.     Patient's spiritual, cultural and educational needs considered and pt agreeable to plan of care and goals.     Anticipated barriers to physical therapy: standard    Goals: updated 08/25/2024  Short Term Goals (4 Weeks):   1. Patient to have improved L dorsiflexion PROM by 25% or greater for ease with ADL's such as stair descent (met)   2. Patient to have improved single limb balance as noted by 10 sec or greater ea LE for improved gait stability (met)  3. Patient to report decreased pain in L ankle by 20% or greater for independence with ADL's (met)     Long Term Goals (8 Weeks):   1. Patient to have improved subjective report of disability as noted by a score of 74% or greater on the FOTO ankle/foot questionnaire (Progressing, not met)  2. Patient to be independent with home exercise program for improved self management of condition (Progressing, not met)  3. Patient will have increased L ankle strength to 4+/5 or greater for independence with ADL's such as squatting (Progressing, not met)  4. Patient will report gradual return to gym exercise without pain exacerbation for return to PLOF. (Progressing, not met)    Plan   Plan of care Certification: 7/8/2024 to 9/6/2024.    Continue per plan of  care with focus on L ankle ROM and strength. Gentle weightbearing activities as tolerated.     Agnieszka Cox, PT

## 2025-01-07 ENCOUNTER — OFFICE VISIT (OUTPATIENT)
Dept: ORTHOPEDICS | Facility: CLINIC | Age: 45
End: 2025-01-07
Payer: COMMERCIAL

## 2025-01-07 ENCOUNTER — HOSPITAL ENCOUNTER (OUTPATIENT)
Dept: RADIOLOGY | Facility: HOSPITAL | Age: 45
Discharge: HOME | End: 2025-01-07
Attending: PHYSICIAN ASSISTANT
Payer: COMMERCIAL

## 2025-01-07 VITALS — WEIGHT: 189.38 LBS | BODY MASS INDEX: 26.51 KG/M2 | HEIGHT: 71 IN

## 2025-01-07 DIAGNOSIS — M54.50 LUMBAR SPINE PAIN: ICD-10-CM

## 2025-01-07 DIAGNOSIS — M25.562 ACUTE PAIN OF LEFT KNEE: ICD-10-CM

## 2025-01-07 DIAGNOSIS — M70.72 ILIOPSOAS BURSITIS OF LEFT HIP: Primary | ICD-10-CM

## 2025-01-07 PROCEDURE — 72110 X-RAY EXAM L-2 SPINE 4/>VWS: CPT | Mod: 26,,, | Performed by: RADIOLOGY

## 2025-01-07 PROCEDURE — 3008F BODY MASS INDEX DOCD: CPT | Mod: CPTII,S$GLB,, | Performed by: PHYSICIAN ASSISTANT

## 2025-01-07 PROCEDURE — 99214 OFFICE O/P EST MOD 30 MIN: CPT | Mod: S$GLB,,, | Performed by: PHYSICIAN ASSISTANT

## 2025-01-07 PROCEDURE — 1160F RVW MEDS BY RX/DR IN RCRD: CPT | Mod: CPTII,S$GLB,, | Performed by: PHYSICIAN ASSISTANT

## 2025-01-07 PROCEDURE — 72110 X-RAY EXAM L-2 SPINE 4/>VWS: CPT | Mod: TC

## 2025-01-07 PROCEDURE — 73560 X-RAY EXAM OF KNEE 1 OR 2: CPT | Mod: TC,RT

## 2025-01-07 PROCEDURE — 99999 PR PBB SHADOW E&M-EST. PATIENT-LVL III: CPT | Mod: PBBFAC,,, | Performed by: PHYSICIAN ASSISTANT

## 2025-01-07 PROCEDURE — 73560 X-RAY EXAM OF KNEE 1 OR 2: CPT | Mod: 26,59,RT, | Performed by: RADIOLOGY

## 2025-01-07 PROCEDURE — 73562 X-RAY EXAM OF KNEE 3: CPT | Mod: 26,LT,, | Performed by: RADIOLOGY

## 2025-01-07 PROCEDURE — 1159F MED LIST DOCD IN RCRD: CPT | Mod: CPTII,S$GLB,, | Performed by: PHYSICIAN ASSISTANT

## 2025-01-07 RX ORDER — MELOXICAM 15 MG/1
15 TABLET ORAL DAILY
Qty: 30 TABLET | Refills: 2 | Status: SHIPPED | OUTPATIENT
Start: 2025-01-07

## 2025-01-07 RX ORDER — MELOXICAM 15 MG/1
15 TABLET ORAL DAILY
Qty: 30 TABLET | Refills: 1 | Status: SHIPPED | OUTPATIENT
Start: 2025-01-07 | End: 2025-01-07

## 2025-01-07 NOTE — PROGRESS NOTES
SUBJECTIVE:     Chief Complaint & History of Present Illness:  José Miguel Hilton is a Established patient 44 y.o. male who is seen here today with a complaint of    Chief Complaint   Patient presents with    Spine - Pain    Left Knee - Pain    . History of Present Illness    - Mr. Hilton presents today for initial evaluation of pain in the upper buttocks, hip, and thigh area, which has been causing limping for about a week.    - Presents with pain in upper buttocks area and thigh, causing limping for about a week  - Pain described as tender to touch, with tightness in the affected area  - History of ankle fracture in June of previous year, which limited exercise routine  - Uses Stairmaster and bike for workouts, with some weight training for legs in November, but nothing intense  - Pain onset gradual over the week, more pronounced in thigh area  - Reports some knee pain  - Self-treating with Arnica topically for short-term relief  - Takes ibuprofen (600mg, twice daily as needed) when pain becomes unbearable  - Experiences stiffness in the morning, with significant discomfort upon waking  - Notices pain while attempting hip flexor stretches on the floor, difficulty maintaining position  - Acknowledges intermittent limping, consciously tries to correct gait but often reverts to limping  - Used a boot following ankle fracture  - Experienced temporary relief on Saturday, feeling fine with no pain and improved walking, but pain returned after brief gym session  - Expresses concern about possibility of sciatic nerve involvement  - Denies any knots in the affected area  - Denies any medical diagnoses    - Ankle fracture in June of previous year  - Limited exercise routine since ankle fracture  - Using Stairmaster and bike for workouts  - Some leg workouts with weights in November  - Current pain affecting mobility, causing intermittent limping  - Coworkers have noticed patient's limping       On a scale of 1-10, with 10 being  "worst pain imaginable, he rates this pain as 1 on good days and 5 on bad days.  he describes the pain as sore and achy.      History reviewed. No pertinent past medical history.    Past Surgical History:   Procedure Laterality Date    REPAIR, TENDON, BICEPS, DISTAL Left 7/11/2023    Procedure: REPAIR, TENDON, BICEPS, DISTAL;  Surgeon: Sidney Morris MD;  Location: [facility];  Service: Orthopedics;  Laterality: Left;  NO REGIONAL BLOCK       No family history on file.    Review of patient's allergies indicates:  No Known Allergies      Current Outpatient Medications:     dolutegravir-lamivudine (DOVATO)  mg Tab, Take 1 tablet by mouth., Disp: , Rfl:     doxycycline (VIBRAMYCIN) 100 MG Cap, Take by mouth., Disp: , Rfl:     finasteride (PROPECIA) 1 mg tablet, Take 1 mg by mouth once daily., Disp: , Rfl:     meloxicam (MOBIC) 15 MG tablet, Take 1 tablet (15 mg total) by mouth once daily., Disp: 30 tablet, Rfl: 2    tadalafiL (CIALIS) 20 MG Tab, Take by mouth., Disp: , Rfl:     Review of Systems:  ROS:  Constitutional: no fever or chills  Eyes: no visual changes  ENT: no nasal congestion or sore throat  Respiratory: no cough or shortness of breath  Cardiovascular: no chest pain or palpitations  Gastrointestinal: no nausea or vomiting, tolerating diet  Genitourinary: no hematuria or dysuria  Integument/Breast: no rash or pruritis  Hematologic/Lymphatic: no easy bruising or lymphadenopathy  Musculoskeletal: no arthralgias or myalgias, history of biceps tendon rupture of the left ankle fracture of the left  Neurological: no seizures or tremors  Behavioral/Psych: no auditory or visual hallucinations  Endocrine: no heat or cold intolerance      PE:  Ht 5' 11" (1.803 m)   Wt 85.9 kg (189 lb 6 oz)   BMI 26.41 kg/m²   General: Pleasant, cooperative, NAD   HEENT: NCAT, sclera nonicteric   Lungs: Respirations are equal and unlabored.   Abdomen: Soft and non-tender.  CV: 2+ bilateral upper and lower extremity pulses. "   Skin: Intact throughout LE with no rashes, erythema, or lesions  Extremities: No LE edema, NVI lower extremities        Hip Exam:   left full painless range of motion, without tenderness    135 degrees flexion  -10 degrees extension   45 degrees internal rotation  40 degrees external rotation  45 degrees abduction  -05 degrees adduction   0 flexion contracture      RADIOGRAPHS:  X-rays of lumbar spine taken today films reviewed by me demonstrate no evidence of fracture dislocation joint spaces well maintained no advanced degenerative joint disease    X-rays of the knees taken today films reviewed by me demonstrate well-preserved joint spaces no evidence of advanced degenerative joint disease fracture dislocation or any other bony abnormalities      ASSESSMENT/PLAN:       ICD-10-CM ICD-9-CM   1. Iliopsoas bursitis of left hip  M70.72 726.5   2. Lumbar spine pain  M54.50 724.2   3. Acute pain of left knee  M25.562 719.46       Plan: We discussed with the patient at length all the different treatment options available for arthrosis of the hip including anti-inflammatories, acetaminophen, rest, ice, lower extremity strengthening exercise, occasional cortisone injections for temporary relief, and finally total hip arthroplasty.   Meloxicam 15 mg q.d. with food times 10 days followed by p.r.n.  Follow-up in 2 weeks if symptoms not significantly improved sooner symptoms dictate  Assessment & Plan    M62.838 Other muscle spasm  M25.559 Pain in unspecified hip  M54.50 Low back pain, unspecified  R26.2 Difficulty in walking, not elsewhere classified  Z87.312 Personal history of (healed) stress fracture    MEDICATIONS PRESCRIBED:  - Prescribed meloxicam daily with food for 10 days. Advised to take Tylenol for breakthrough pain if needed, in addition to meloxicam.    FOLLOW UP:  - Schedule follow-up visit in 2 weeks. Cancel appointment if patient is feeling fine and does not need further assistance.     This note was  generated with the assistance of ambient listening technology. Verbal consent was obtained by the patient and accompanying visitor(s) for the recording of patient appointment to facilitate this note. I attest to having reviewed and edited the generated note for accuracy, though some syntax or spelling errors may persist. Please contact the author of this note for any clarification.

## 2025-01-28 ENCOUNTER — PATIENT MESSAGE (OUTPATIENT)
Dept: ORTHOPEDICS | Facility: CLINIC | Age: 45
End: 2025-01-28
Payer: COMMERCIAL

## 2025-01-29 DIAGNOSIS — M70.72 ILIOPSOAS BURSITIS OF LEFT HIP: Primary | ICD-10-CM

## 2025-01-29 RX ORDER — DICLOFENAC SODIUM 10 MG/G
2 GEL TOPICAL 4 TIMES DAILY
Qty: 400 G | Refills: 0 | Status: SHIPPED | OUTPATIENT
Start: 2025-01-29 | End: 2025-02-08

## 2025-02-03 ENCOUNTER — CLINICAL SUPPORT (OUTPATIENT)
Dept: REHABILITATION | Facility: OTHER | Age: 45
End: 2025-02-03
Payer: COMMERCIAL

## 2025-02-03 DIAGNOSIS — M25.652 DECREASED RANGE OF LEFT HIP MOVEMENT: Primary | ICD-10-CM

## 2025-02-03 DIAGNOSIS — M70.72 ILIOPSOAS BURSITIS OF LEFT HIP: ICD-10-CM

## 2025-02-03 DIAGNOSIS — R29.3 POSTURAL IMBALANCE: ICD-10-CM

## 2025-02-03 PROCEDURE — 97161 PT EVAL LOW COMPLEX 20 MIN: CPT | Mod: PN

## 2025-02-03 PROCEDURE — 97112 NEUROMUSCULAR REEDUCATION: CPT | Mod: PN

## 2025-02-03 NOTE — PROGRESS NOTES
Outpatient Rehab    Physical Therapy Evaluation    Patient Name: José Miguel Hilton  MRN: [id number]  YOB: 1980  Today's Date: 2/4/2025    Therapy Diagnosis:   Encounter Diagnoses   Name Primary?    Iliopsoas bursitis of left hip     Decreased range of left hip movement Yes    Postural imbalance      Physician: Paul Levine PA*    Physician Orders: Eval and Treat  Medical Diagnosis: M70.72 (ICD-10-CM) - Iliopsoas bursitis of left hip     Visit # / Visits Authorized:  1 / 1   Date of Evaluation:  2/3/2025   Insurance Authorization Period:1/29/2025 to 1/29/2026  Plan of Care Certification:  2/3/2025 to 3/31/2025      Time In: 1503   Time Out: 1551  Total Time: 48   Total Billable Time separate from evaluation: 15 minutes     Subjective   History of Present Illness  José Miguel is a 44 y.o. male who reports to physical therapy with a chief concern of L lower back and L hip/leg pain. According to the patient's chart, José Miguel has no past medical history on file. José Miguel has a past surgical history that includes repair, tendon, biceps, distal (Left, 7/11/2023).    The patient reports a medical diagnosis of M70.72 (ICD-10-CM) - Iliopsoas bursitis of left hip. The patient has experienced this issue since 01/29/25.           History of Present Condition/Illness: Patient has been experiencing pain in his L upper buttocks and lower spine on the L. Also having pain into the L groin. Can also have pain into the L knee. The pain can cause him to limp. Went to the doctor and was prescribed Meloxicam which helped with the pain.    Pain     Patient reports a current pain level of 1/10. Pain at best is reported as 0/10. Pain at worst is reported as 7/10.   Location: L upper buttock, L hip, L groin, L knee  Clinical Progression (since onset): Improved  Pain Qualities: Other (Comment)  Other Pain Qualities: constant, consentrated, pulsating  Pain-Relieving Factors: Medications - prescription, Stretching  Pain-Aggravating Factors:  Sitting         Living Arrangements  Living Situation  Living Arrangements: Alone        Employment  Employment Status: Employed full-time        Past Medical History/Physical Systems Review:   José Miguel Hilton  has no past medical history on file.    José Miguel Hilton  has a past surgical history that includes repair, tendon, biceps, distal (Left, 7/11/2023).    José Miguel has a current medication list which includes the following prescription(s): diclofenac sodium, dovato, doxycycline, finasteride, meloxicam, and tadalafil.    Review of patient's allergies indicates:  No Known Allergies     Objective      Hip Palpation  Tender to palpation in left gluteus medius, left piriformis, and left greater trochanter.          Hip Range of Motion   Right Hip   Active (deg) Passive (deg) Pain   External Rotation 90/90 45       Internal Rotation 90/90 45           Left Hip   Active (deg) Passive (deg) Pain   External Rotation 90/90 35       Internal Rotation 90/90 45            Hip Strength - Planes of Motion   Right Strength Right Pain Left Strength Left  Pain   Flexion (L2) 5   5     Extension 5   5     ABduction 4   4-     ADduction 5   5     Internal Rotation 5   4+     External Rotation 5   5         Knee Strength   Right Strength Right Pain Left Strength Left  Pain   Flexion (S2) 5   5     Prone Flexion 5   5     Extension (L3) 5   5            Ankle/Foot Strength - Planes of Motion   Right Strength Right Pain Left Strength Left  Pain   Dorsiflexion (L4) 5   5     Plantar Flexion (S1) 5   5     Inversion 5   5     Eversion 5   5            Lumbar/Pelvic Girdle Special Tests  Pelvic Girdle / Sacrum Tests  Negative: Right Gaenslen's, Left Gaenslen's, Right Sacroiliac Compression, and Left Sacroiliac Compression  Negative: Left Thigh Thrust/Posterior Shear         Hip Special Tests  Flex/Imbalance/Structural Tests  Positive: Left Darrius's, decreased  by 50%  Negative: Right Darrius's  Sacroiliac Joint Tests  Negative: Right Gaenslen's, Left  Gaenslen's, Right Sacral Thrust, Left Sacral Thrust, Right Compression, Left Compression, Right Distraction, Left Distraction, and Left Thigh Thrust/Posterior Shear  Fawad Test  Positive: Right and Left, decreased 25%     90/90 Hamstring Test  Negative: Right and Left  Right 90/90 Hamstring Test Hip Flexion: 90  Left 90/90 Hamstring Test Hip Flexion: 90  Right 90/90 Hamstring Test Knee Extension: 10  Left 90/90 Hamstring Test Knee Extension: 15       Knee Special Tests  Knee Flexibility Tests  Positive: Left Darrius's, decreased  by 50%  Negative: Right Darrius's     Intake Outcome Measure for FOTO Survey    Therapist reviewed FOTO scores for José Miguel Hilton on 2/3/2025.   FOTO report - see Media section or FOTO account episode details.     Intake Score: 63%    Treatment:  Balance/Neuromuscular Re-Education  Balance/Neuromuscular Re-Education Activity 1: hip airplanes x 20 reps  Balance/Neuromuscular Re-Education Activity 2: standing firehydrants with red band x 20 reps  Balance/Neuromuscular Re-Education Activity 3: kickstand RDLs with adductor bias with foam roll x 20 reps    Patient's spiritual, cultural, and educational needs considered and patient agreeable to plan of care and goals.     Assessment & Plan   Assessment  José Miguel presents with a condition of Low complexity.   Presentation of Symptoms: Stable       Functional Limitations: Sitting tolerance  Impairments: Abnormal muscle firing, Abnormal muscle tone, Abnormal or restricted range of motion, Impaired physical strength    Patient Goal for Therapy (PT): No pain, learn exercises  Prognosis: Excellent  Assessment Details: Patient presenting with decreased left hip range of motion and weakness in his hips contributing to pain and dysfuction in the hip. Tender to palpation in left piriformis, left greater trochanter, and external rotators of the hip at greater trochanter. Tightness in left tensor fascia ricky also contributing to pain. May be a candidate for dry  needling. Will proceed with postural reeducation, core strengthening, lower extremity strengthening and stretching to progress to goals.    Plan  From a physical therapy perspective, the patient would benefit from: Skilled Rehab Services    Planned therapy interventions include: Therapeutic exercise, Therapeutic activities, Neuromuscular re-education, and Manual therapy.    Planned modalities to include: Electrical stimulation - passive/unattended and Other (Comment). Dry Needling      Visit Frequency: 1 times Per Week for 8 Weeks.       This plan was discussed with Patient.   Plan details: Continue with postural reeducation, pelvic stabilization, lower extremity strengthening and stretching, and dry needling.          Goals:   Active       1. Short Term        Independent with home exercise program        Start:  02/04/25    Expected End:  03/04/25            Increase left hip external rotation to 45 degrees        Start:  02/04/25    Expected End:  03/04/25            Patient to sit at desk for 1 hour with pain < or = 4/10       Start:  02/04/25    Expected End:  03/04/25               2. Long Term       Increase left hip external rotation to 55 degrees        Start:  02/04/25    Expected End:  04/01/25            Increase hip strength to 5/5 with manual muscle testing       Start:  02/04/25    Expected End:  04/01/25            Patient to sit at desk for an hour with pain < or = 2/10       Start:  02/04/25    Expected End:  04/01/25            Increased flexibility in left hip flexors and left iliotibial band to increase sitting time.       Start:  02/04/25    Expected End:  04/01/25

## 2025-02-03 NOTE — PATIENT INSTRUCTIONS
Single Leg RDL with Foam Roll /adduction bias          - Engage your transverse abs and pelvic floor the entire time!  - Stand on one leg, put a foam roller between the standing leg and the wall and push against the wall. This will help activate your inner thighs and pelvic floor.  - Inhale when you stand up, exhale as you hinge your hips and lower down. Progress to increased weight.     Perform 20 repetitions each leg.    Copyright © 2025 [employer]    Hip airplanes          Stand next to a wall, stand on the leg next to the wall, and you can rest a hand lightly on the wall.  Hinge forward so that your non weight bearing leg is out behind you (left photo.)  This is your starting position.  From here, rotate your non weight bearing hip toward the ceiling as far as you can (top photo.)  Pause here, then rotate that hip down toward the ground as far as you can (bottom photo.)  This is one rep.  Perform the assigned reps on both sides.    Move slowly and with good control.  This exercise is great to do without shoes.    Perform 20 repetitions each leg.    Copyright © 2025 [employer]    Fire Hydrants        Hinge at hips and bend knees slightly. Lift one foot and push that knee outward without opening your hips or trunk.     Make sure that the knee of your stance leg is not caving inward as you move your leg out. Move slowly and with control.    Perform 20 repetitions each leg.    Copyright © 2025 [employer]

## 2025-02-04 PROBLEM — R29.3 POSTURAL IMBALANCE: Status: ACTIVE | Noted: 2025-02-04

## 2025-02-04 PROBLEM — M25.652 DECREASED RANGE OF LEFT HIP MOVEMENT: Status: ACTIVE | Noted: 2025-02-04

## 2025-02-10 ENCOUNTER — CLINICAL SUPPORT (OUTPATIENT)
Dept: REHABILITATION | Facility: OTHER | Age: 45
End: 2025-02-10
Payer: COMMERCIAL

## 2025-02-10 DIAGNOSIS — M25.652 DECREASED RANGE OF LEFT HIP MOVEMENT: ICD-10-CM

## 2025-02-10 DIAGNOSIS — R29.3 POSTURAL IMBALANCE: ICD-10-CM

## 2025-02-10 DIAGNOSIS — M70.72 ILIOPSOAS BURSITIS OF LEFT HIP: Primary | ICD-10-CM

## 2025-02-10 PROCEDURE — 97530 THERAPEUTIC ACTIVITIES: CPT | Mod: PN

## 2025-02-10 PROCEDURE — 97140 MANUAL THERAPY 1/> REGIONS: CPT | Mod: PN

## 2025-02-10 PROCEDURE — 97112 NEUROMUSCULAR REEDUCATION: CPT | Mod: PN

## 2025-02-10 NOTE — PROGRESS NOTES
"  Outpatient Rehab    Physical Therapy Visit    Patient Name: José Miguel Hilton  MRN: [id number]  YOB: 1980  Today's Date: 2/10/2025    Therapy Diagnosis:   Encounter Diagnoses   Name Primary?    Iliopsoas bursitis of left hip Yes    Decreased range of left hip movement     Postural imbalance      Physician: Paul Levine PA*    Physician Orders: Eval and Treat  Medical Diagnosis:  Iliopsoas bursitis of left hip     Visit # / Visits Authorized:  1 / 10   Date of Evaluation:  2/3/2025  Insurance Authorization Period: 2/3/2025 to 12/31/2025  Plan of Care Certification:  2/3/2025 to 3/31/2025      Time In: 1504   Time Out: 1610  Total Time: 66   Total Billable Time: 60         Subjective   He is in more pain today than the last time. Not sure why but he can't find a comfortable position. He took the meloxicam which usually helps but not today. He did a lot of walking and stayed out late over the weekend..  Pain reported as 4/10.      Objective            Treatment:  Therapeutic Exercise  Therapeutic Exercise Activity 1: ITB, prone quad stretch 3x30 seconds    Manual Therapy  Manual Therapy Activity 1: Theraroller to L ITB and glute    Balance/Neuromuscular Re-Education  Balance/Neuromuscular Re-Education Activity 1: hip airplanes x 20 reps  Balance/Neuromuscular Re-Education Activity 2: standing firehydrants with red band x 20 reps  Balance/Neuromuscular Re-Education Activity 3: kickstand RDLs with adductor bias with foam roll x 20 reps  Balance/Neuromuscular Re-Education Activity 4: Bridge with red medium loop for iso ABD x20; Clamshells with red (medium) loop x20 each  Balance/Neuromuscular Re-Education Activity 5: Hip 7-way 2x 2 B  Balance/Neuromuscular Re-Education Activity 6: Quadruped hip hike with foam x15 B    Therapeutic Activity  Therapeutic Activity 1: side steps with red (medium) loop around knees 2x 10 yds each  Therapeutic Activity 2: Reverse plank with 6" box 3x 30 seconds    Patient's " spiritual, cultural, and educational needs considered and patient agreeable to plan of care and goals.     Assessment & Plan   Assessment: Patient tolerated treatment well today without increase in symptoms. Appropriate muscle activation and fatigue reported. Improved sx and decreased tissue tension with manual interventions. Consider adding dry needling at next visit.           Plan: Continue with established POC. Progress hip strengthening and mobility. Consider adding dry needling to L glutes and ITB    Goals:   Active       1. Short Term        Independent with home exercise program        Start:  02/04/25    Expected End:  03/04/25            Increase left hip external rotation to 45 degrees        Start:  02/04/25    Expected End:  03/04/25            Patient to sit at desk for 1 hour with pain < or = 4/10       Start:  02/04/25    Expected End:  03/04/25               2. Long Term       Increase left hip external rotation to 55 degrees        Start:  02/04/25    Expected End:  04/01/25            Increase hip strength to 5/5 with manual muscle testing       Start:  02/04/25    Expected End:  04/01/25            Patient to sit at desk for an hour with pain < or = 2/10       Start:  02/04/25    Expected End:  04/01/25            Increased flexibility in left hip flexors and left iliotibial band to increase sitting time.       Start:  02/04/25    Expected End:  04/01/25                Nancy Borjas PT

## 2025-02-10 NOTE — PROGRESS NOTES
Outpatient Rehab    Physical Therapy Visit    Patient Name: José Miguel Hilton  MRN: [id number]  YOB: 1980  Today's Date: 2/10/2025    Therapy Diagnosis:   Encounter Diagnoses   Name Primary?    Iliopsoas bursitis of left hip Yes    Decreased range of left hip movement     Postural imbalance      Physician: Paul Levine PA*    Physician Orders: Eval and Treat  Medical Diagnosis:  Iliopsoas bursitis of left hip     Visit # / Visits Authorized:  1 / 10   Date of Evaluation:  2/3/2025  Insurance Authorization Period: 2/3/2025 to 12/31/2025  Plan of Care Certification:  2/3/2025 to 3/31/2025      Time In: 1504   Time Out:    Total Time:     Total Billable Time: ***         Subjective   He is in more pain today than the last time. Not sure why but he can't find a comfortable position. He took the meloxicam which usually helps but not today. He did a lot of walking and stayed out late over the weekend..  Pain reported as 4/10.      Objective            Treatment:  Therapeutic Exercise  Therapeutic Exercise Activity 1: ITB,  stretch 3x30 seconds         Balance/Neuromuscular Re-Education  Balance/Neuromuscular Re-Education Activity 1: hip airplanes x 20 reps  Balance/Neuromuscular Re-Education Activity 2: standing firehydrants with red band x 20 reps  Balance/Neuromuscular Re-Education Activity 3: kickstand RDLs with adductor bias with foam roll x 20 reps  Balance/Neuromuscular Re-Education Activity 4: Bridge with red medium loop for iso ABD x20; Clamshells with red (medium) loop x20 each  Balance/Neuromuscular Re-Education Activity 5: Hip 7-way 2x 2 B  Balance/Neuromuscular Re-Education Activity 6: Quadruped hip hike with foam x15 B    Patient's spiritual, cultural, and educational needs considered and patient agreeable to plan of care and goals.     Assessment & Plan   Assessment:             Plan: Continue with established POC. Progress hip strengthening and mobility    Goals:   Active       1. Short  Term        Independent with home exercise program        Start:  02/04/25    Expected End:  03/04/25            Increase left hip external rotation to 45 degrees        Start:  02/04/25    Expected End:  03/04/25            Patient to sit at desk for 1 hour with pain < or = 4/10       Start:  02/04/25    Expected End:  03/04/25               2. Long Term       Increase left hip external rotation to 55 degrees        Start:  02/04/25    Expected End:  04/01/25            Increase hip strength to 5/5 with manual muscle testing       Start:  02/04/25    Expected End:  04/01/25            Patient to sit at desk for an hour with pain < or = 2/10       Start:  02/04/25    Expected End:  04/01/25            Increased flexibility in left hip flexors and left iliotibial band to increase sitting time.       Start:  02/04/25    Expected End:  04/01/25                Nancy Borjas, PT

## 2025-02-17 ENCOUNTER — CLINICAL SUPPORT (OUTPATIENT)
Dept: REHABILITATION | Facility: OTHER | Age: 45
End: 2025-02-17
Payer: COMMERCIAL

## 2025-02-17 DIAGNOSIS — M62.81 MUSCLE WEAKNESS OF LOWER EXTREMITY: Primary | ICD-10-CM

## 2025-02-17 DIAGNOSIS — R29.3 POSTURAL IMBALANCE: ICD-10-CM

## 2025-02-17 DIAGNOSIS — M25.652 DECREASED RANGE OF LEFT HIP MOVEMENT: ICD-10-CM

## 2025-02-17 PROCEDURE — 97112 NEUROMUSCULAR REEDUCATION: CPT | Mod: PN

## 2025-02-17 NOTE — PROGRESS NOTES
Outpatient Rehab    Physical Therapy Visit    Patient Name: José Miguel Hilton  MRN: [id number]  YOB: 1980  Today's Date: 2/17/2025    Therapy Diagnosis:   Encounter Diagnoses   Name Primary?    Muscle weakness of lower extremity Yes    Decreased range of left hip movement     Postural imbalance      Physician: Paul Levine PA*    Physician Orders: Eval and Treat  Medical Diagnosis:  Iliopsoas bursitis of left hip      Visit # / Visits Authorized:  1 / 10   Date of Evaluation:  2/3/2025  Insurance Authorization Period: 2/3/2025 to 12/31/2025  Plan of Care Certification:  2/3/2025 to 3/31/2025      Time In: 1548   Time Out: 1645  Total Time: 57   Total Billable Time: 57         Subjective   feels pain and tightness deep in his left hip. interested in dry needling, but doesn't like needles..         Objective            Treatment:  Therapeutic Exercise  Therapeutic Exercise Activity 1: recumbent bike x 6'    Balance/Neuromuscular Re-Education  Balance/Neuromuscular Re-Education Activity 1: hip airplanes x 20 reps  Balance/Neuromuscular Re-Education Activity 2: standing firehydrants with red band x 20 reps  Balance/Neuromuscular Re-Education Activity 3: kickstand RDLs with adductor bias with foam roll x 20 reps  Balance/Neuromuscular Re-Education Activity 4: Bridge with red medium loop for iso ABD x20; Clamshells with red (medium) loop x20 each  Balance/Neuromuscular Re-Education Activity 5: Hip 7-way 2 x 2 B  Balance/Neuromuscular Re-Education Activity 6: Quadruped hip hike with foam 2 x 15 B  Balance/Neuromuscular Re-Education Activity 7: dry needling for muscle inhibition to L gluteus medius, L gluteus minimus, Piriformis (2 sites), and L Tensor Fascia Kaity 50mm and 60 mm needles utilized. Winding performed at 5 minutes         Assessment & Plan   Assessment: Initiated dry needling today. Good response to the dry needling.       Patient will continue to benefit from skilled outpatient physical therapy  to address the deficits listed in the problem list box on initial evaluation, provide pt/family education and to maximize pt's level of independence in the home and community environment.     Patient's spiritual, cultural, and educational needs considered and patient agreeable to plan of care and goals.     Education  Education was done with Patient. The patient's learning style includes Demonstration and Listening. The patient Demonstrates understanding.         Reviewed benefits and risks for dry needling. Patient verbally agreed to dry needling and signed consent form. Discussed drinking water today for hydration following dry needling. Discussed benefit of heat versus ice if patient experiences needle site pain.        Plan: Continue with dry needling, postural reeducation, and LE strengthening and stretching    Goals:   Active       1. Short Term        Independent with home exercise program        Start:  02/04/25    Expected End:  03/04/25            Increase left hip external rotation to 45 degrees        Start:  02/04/25    Expected End:  03/04/25            Patient to sit at desk for 1 hour with pain < or = 4/10       Start:  02/04/25    Expected End:  03/04/25               2. Long Term       Increase left hip external rotation to 55 degrees        Start:  02/04/25    Expected End:  04/01/25            Increase hip strength to 5/5 with manual muscle testing       Start:  02/04/25    Expected End:  04/01/25            Patient to sit at desk for an hour with pain < or = 2/10       Start:  02/04/25    Expected End:  04/01/25            Increased flexibility in left hip flexors and left iliotibial band to increase sitting time.       Start:  02/04/25    Expected End:  04/01/25                Braydon Velazquez, PT

## 2025-02-24 ENCOUNTER — CLINICAL SUPPORT (OUTPATIENT)
Dept: REHABILITATION | Facility: OTHER | Age: 45
End: 2025-02-24
Payer: COMMERCIAL

## 2025-02-24 DIAGNOSIS — M25.652 DECREASED RANGE OF LEFT HIP MOVEMENT: ICD-10-CM

## 2025-02-24 DIAGNOSIS — M62.81 MUSCLE WEAKNESS OF LOWER EXTREMITY: Primary | ICD-10-CM

## 2025-02-24 DIAGNOSIS — R29.3 POSTURAL IMBALANCE: ICD-10-CM

## 2025-02-24 PROCEDURE — 97112 NEUROMUSCULAR REEDUCATION: CPT | Mod: PN

## 2025-02-24 NOTE — PROGRESS NOTES
Outpatient Rehab    Physical Therapy Visit    Patient Name: José Miguel Hilton  MRN: [id number]  YOB: 1980  Encounter Date: 2/24/2025    Therapy Diagnosis:   Encounter Diagnoses   Name Primary?    Muscle weakness of lower extremity Yes    Decreased range of left hip movement     Postural imbalance      Physician: Paul Levine PA*    Physician Orders: Eval and Treat  Medical Diagnosis:  Iliopsoas bursitis of left hip      Visit # / Visits Authorized:  4 (3/ 10)   Date of Evaluation:  2/3/2025  Insurance Authorization Period: 2/3/2025 to 12/31/2025  Plan of Care Certification:  2/3/2025 to 3/31/2025  Focus On Therapeutic Outcomes: 4/5, 1. 2/3/2025  PTA Visit #: 0/5       Time In: 1604   Time Out: 1700  Total Time: 56   Total Billable Time: 56 minutes      Subjective   states the dry needling was extremely beneficial. May be ready for discharge soon.  Pain reported as 0/10.      Objective            Treatment:    Below therapeutic interventions were performed under the supervision of therapist and physical therapy technician.    Balance/Neuromuscular Re-Education  Balance/Neuromuscular Re-Education Activity 1: hip airplanes x 20 reps  Balance/Neuromuscular Re-Education Activity 2: standing firehydrants with red band x 20 reps  Balance/Neuromuscular Re-Education Activity 3: kickstand RDLs with adductor bias with foam roll x 20 reps  Balance/Neuromuscular Re-Education Activity 4: Bridge with red medium loop for iso ABD x20; Clamshells with red (medium) loop x20 each  Balance/Neuromuscular Re-Education Activity 5: Hip 7-way 2 x 2 B  Balance/Neuromuscular Re-Education Activity 6: Quadruped hip hike with foam 2 x 15 B  Balance/Neuromuscular Re-Education Activity 7: dry needling for muscle inhibition to L gluteus medius, L gluteus minimus, Piriformis (2 sites), and L Tensor Fascia Kaity 50mm and 60 mm needles utilized. Winding performed at 5 minutes         Assessment & Plan   Assessment: Patient has  experienced positive response to dry needling. Will assess pain. Patient may be ready for discharge. Patient has a follow up visit scheduled and will reach out to therapist if he wishes to discgarge from physical therapy  Evaluation/Treatment Tolerance: Patient tolerated treatment well    Patient will continue to benefit from skilled outpatient physical therapy to address the deficits listed in the problem list box on initial evaluation, provide pt/family education and to maximize pt's level of independence in the home and community environment.     Patient's spiritual, cultural, and educational needs considered and patient agreeable to plan of care and goals.           Plan: Continue with dry needling, postural reeducation, and LE strengthening and stretching. Patient will reach out to therapist if he wishes to discharge from PT.    Goals:   Active       1. Short Term        Independent with home exercise program        Start:  02/04/25    Expected End:  03/04/25            Increase left hip external rotation to 45 degrees        Start:  02/04/25    Expected End:  03/04/25            Patient to sit at desk for 1 hour with pain < or = 4/10       Start:  02/04/25    Expected End:  03/04/25               2. Long Term       Increase left hip external rotation to 55 degrees        Start:  02/04/25    Expected End:  04/01/25            Increase hip strength to 5/5 with manual muscle testing       Start:  02/04/25    Expected End:  04/01/25            Patient to sit at desk for an hour with pain < or = 2/10       Start:  02/04/25    Expected End:  04/01/25            Increased flexibility in left hip flexors and left iliotibial band to increase sitting time.       Start:  02/04/25    Expected End:  04/01/25                Braydon Velazquez, PT

## 2025-03-25 RX ORDER — MELOXICAM 15 MG/1
15 TABLET ORAL
Qty: 30 TABLET | Refills: 2 | Status: SHIPPED | OUTPATIENT
Start: 2025-03-25

## 2025-06-23 RX ORDER — MELOXICAM 15 MG/1
15 TABLET ORAL
Qty: 30 TABLET | Refills: 2 | Status: SHIPPED | OUTPATIENT
Start: 2025-06-23

## 2025-08-15 ENCOUNTER — OFFICE VISIT (OUTPATIENT)
Dept: URGENT CARE | Facility: CLINIC | Age: 45
End: 2025-08-15
Payer: COMMERCIAL

## 2025-08-15 VITALS
DIASTOLIC BLOOD PRESSURE: 89 MMHG | OXYGEN SATURATION: 99 % | BODY MASS INDEX: 26.46 KG/M2 | HEART RATE: 103 BPM | WEIGHT: 189 LBS | HEIGHT: 71 IN | TEMPERATURE: 98 F | SYSTOLIC BLOOD PRESSURE: 144 MMHG | RESPIRATION RATE: 20 BRPM
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
## 2025-08-15 DIAGNOSIS — M54.50 ACUTE LEFT-SIDED LOW BACK PAIN WITHOUT SCIATICA: ICD-10-CM

## 2025-08-15 RX ORDER — TESTOSTERONE CYPIONATE 200 MG/ML
INJECTION, SOLUTION INTRAMUSCULAR
COMMUNITY
Start: 2025-08-07

## 2025-08-15 RX ORDER — CHORIONIC GONADOTROPIN 10000 UNIT
KIT INTRAMUSCULAR
COMMUNITY
Start: 2025-03-05

## 2025-08-15 RX ORDER — VALACYCLOVIR HYDROCHLORIDE 1 G/1
TABLET, FILM COATED ORAL
COMMUNITY
Start: 2025-06-03

## 2025-08-15 RX ORDER — ANASTROZOLE 1 MG/1
1 TABLET ORAL
COMMUNITY
Start: 2025-08-07

## 2025-08-15 RX ORDER — KETOROLAC TROMETHAMINE 30 MG/ML
30 INJECTION, SOLUTION INTRAMUSCULAR; INTRAVENOUS
Status: COMPLETED | OUTPATIENT
Start: 2025-08-15 | End: 2025-08-15

## 2025-08-15 RX ORDER — CYCLOBENZAPRINE HCL 10 MG
10 TABLET ORAL EVERY 8 HOURS PRN
Qty: 21 TABLET | Refills: 0 | Status: SHIPPED | OUTPATIENT
Start: 2025-08-15

## 2025-08-15 RX ORDER — DEXAMETHASONE SODIUM PHOSPHATE 10 MG/ML
10 INJECTION INTRAMUSCULAR; INTRAVENOUS
Status: COMPLETED | OUTPATIENT
Start: 2025-08-15 | End: 2025-08-15

## 2025-08-15 RX ADMIN — KETOROLAC TROMETHAMINE 30 MG: 30 INJECTION, SOLUTION INTRAMUSCULAR; INTRAVENOUS at 01:08

## 2025-08-15 RX ADMIN — DEXAMETHASONE SODIUM PHOSPHATE 10 MG: 10 INJECTION INTRAMUSCULAR; INTRAVENOUS at 01:08

## 2025-09-04 ENCOUNTER — OFFICE VISIT (OUTPATIENT)
Dept: ORTHOPEDICS | Facility: CLINIC | Age: 45
End: 2025-09-04
Payer: COMMERCIAL

## 2025-09-04 ENCOUNTER — HOSPITAL ENCOUNTER (OUTPATIENT)
Dept: RADIOLOGY | Facility: HOSPITAL | Age: 45
Discharge: HOME | End: 2025-09-04
Attending: NURSE PRACTITIONER
Payer: COMMERCIAL

## 2025-09-04 DIAGNOSIS — Z21 ASYMPTOMATIC HIV INFECTION, WITH NO HISTORY OF HIV-RELATED ILLNESS: ICD-10-CM

## 2025-09-04 DIAGNOSIS — R52 PAIN: Primary | ICD-10-CM

## 2025-09-04 DIAGNOSIS — G57.02 PYRIFORMIS SYNDROME, LEFT: ICD-10-CM

## 2025-09-04 DIAGNOSIS — M25.552 LEFT HIP PAIN: Primary | ICD-10-CM

## 2025-09-04 DIAGNOSIS — R52 PAIN: ICD-10-CM

## 2025-09-04 PROCEDURE — 99999 PR PBB SHADOW E&M-EST. PATIENT-LVL III: CPT | Mod: PBBFAC,,, | Performed by: NURSE PRACTITIONER

## 2025-09-04 PROCEDURE — 99214 OFFICE O/P EST MOD 30 MIN: CPT | Mod: S$GLB,,, | Performed by: NURSE PRACTITIONER

## 2025-09-04 PROCEDURE — 72100 X-RAY EXAM L-S SPINE 2/3 VWS: CPT | Mod: 26,,, | Performed by: RADIOLOGY

## 2025-09-04 PROCEDURE — 1159F MED LIST DOCD IN RCRD: CPT | Mod: CPTII,S$GLB,, | Performed by: NURSE PRACTITIONER

## 2025-09-04 PROCEDURE — 73502 X-RAY EXAM HIP UNI 2-3 VIEWS: CPT | Mod: TC,LT

## 2025-09-04 PROCEDURE — 73502 X-RAY EXAM HIP UNI 2-3 VIEWS: CPT | Mod: 26,LT,, | Performed by: RADIOLOGY

## 2025-09-04 PROCEDURE — 72100 X-RAY EXAM L-S SPINE 2/3 VWS: CPT | Mod: TC

## 2025-09-04 PROCEDURE — 1160F RVW MEDS BY RX/DR IN RCRD: CPT | Mod: CPTII,S$GLB,, | Performed by: NURSE PRACTITIONER

## 2025-09-04 RX ORDER — IBUPROFEN 600 MG/1
600 TABLET, FILM COATED ORAL EVERY 8 HOURS PRN
Qty: 30 TABLET | Refills: 0 | Status: SHIPPED | OUTPATIENT
Start: 2025-09-04

## (undated) DEVICE — GOWN ECLIPSE REINF LV4 XLNG XL

## (undated) DEVICE — GAUZE SPONGE 4X4 12PLY

## (undated) DEVICE — TOURNIQUET SB QC DP 18X4IN

## (undated) DEVICE — DRAPE STERI U-SHAPED 47X51IN

## (undated) DEVICE — SOL NACL IRR 1000ML BTL

## (undated) DEVICE — SUT CTD VICRYL 0 UND BR

## (undated) DEVICE — SUT MONOCRYL 3-0 PS-2 UND

## (undated) DEVICE — DRAPE U SPLIT SHEET 54X76IN

## (undated) DEVICE — SUT FIBERWIRE TIGER 2M

## (undated) DEVICE — BANDAGE MATRIX HK LOOP 4IN 5YD

## (undated) DEVICE — NDL SAFETY 22G X 1.5 ECLIPSE

## (undated) DEVICE — PAD ABDOMINAL STERILE 8X10IN

## (undated) DEVICE — SUT 2-0 VICRYL / SH (J417)

## (undated) DEVICE — SUT TAPE TIGERLOOP 1.3MM

## (undated) DEVICE — ELECTRODE REM PLYHSV RETURN 9

## (undated) DEVICE — SUT TAPE FIBERLOOP 1.3MM

## (undated) DEVICE — Device

## (undated) DEVICE — GLOVE PROTEXIS LIGHT BROWN 8.5

## (undated) DEVICE — GLOVE PROTEXIS LTX MICRO 8

## (undated) DEVICE — COVER CAMERA OPERATING ROOM

## (undated) DEVICE — ADHESIVE DERMABOND ADVANCED

## (undated) DEVICE — PAD CAST SPECIALIST STRL 4

## (undated) DEVICE — DRAPE C-ARM MINI DISP

## (undated) DEVICE — BNDG COFLEX FOAM LF2 ST 6X5YD

## (undated) DEVICE — PENCIL ROCKER SWITCH 10FT CORD

## (undated) DEVICE — SYR IRRIGATION BULB STER 60ML